# Patient Record
Sex: FEMALE | Race: WHITE | NOT HISPANIC OR LATINO
[De-identification: names, ages, dates, MRNs, and addresses within clinical notes are randomized per-mention and may not be internally consistent; named-entity substitution may affect disease eponyms.]

---

## 2017-01-30 ENCOUNTER — APPOINTMENT (OUTPATIENT)
Dept: INTERNAL MEDICINE | Facility: CLINIC | Age: 22
End: 2017-01-30

## 2017-01-30 VITALS
DIASTOLIC BLOOD PRESSURE: 80 MMHG | TEMPERATURE: 98.3 F | BODY MASS INDEX: 23.7 KG/M2 | WEIGHT: 160 LBS | HEIGHT: 69 IN | HEART RATE: 72 BPM | SYSTOLIC BLOOD PRESSURE: 120 MMHG

## 2017-01-30 VITALS — HEIGHT: 69 IN | BODY MASS INDEX: 23.25 KG/M2 | WEIGHT: 157 LBS

## 2017-01-30 DIAGNOSIS — Z87.09 PERSONAL HISTORY OF OTHER DISEASES OF THE RESPIRATORY SYSTEM: ICD-10-CM

## 2017-01-30 LAB — S PYO AG SPEC QL IA: NEGATIVE

## 2017-02-17 PROBLEM — Z87.09 HISTORY OF ACUTE SINUSITIS: Status: RESOLVED | Noted: 2017-01-30 | Resolved: 2017-02-17

## 2017-02-17 RX ORDER — CEFUROXIME AXETIL 250 MG/1
250 TABLET ORAL
Qty: 20 | Refills: 0 | Status: COMPLETED | COMMUNITY
Start: 2017-01-30 | End: 2017-02-17

## 2017-02-21 ENCOUNTER — RESULT CHARGE (OUTPATIENT)
Age: 22
End: 2017-02-21

## 2017-02-22 ENCOUNTER — APPOINTMENT (OUTPATIENT)
Dept: INTERNAL MEDICINE | Facility: CLINIC | Age: 22
End: 2017-02-22

## 2017-02-22 VITALS
HEART RATE: 60 BPM | HEIGHT: 69 IN | RESPIRATION RATE: 11 BRPM | WEIGHT: 158 LBS | DIASTOLIC BLOOD PRESSURE: 70 MMHG | BODY MASS INDEX: 23.4 KG/M2 | SYSTOLIC BLOOD PRESSURE: 120 MMHG

## 2017-02-22 DIAGNOSIS — N93.9 ABNORMAL UTERINE AND VAGINAL BLEEDING, UNSPECIFIED: ICD-10-CM

## 2017-02-22 DIAGNOSIS — Z87.09 PERSONAL HISTORY OF OTHER DISEASES OF THE RESPIRATORY SYSTEM: ICD-10-CM

## 2017-02-23 ENCOUNTER — RESULT REVIEW (OUTPATIENT)
Age: 22
End: 2017-02-23

## 2017-02-23 LAB
ALBUMIN SERPL ELPH-MCNC: 4.6 G/DL
ALP BLD-CCNC: 55 U/L
ALT SERPL-CCNC: 32 U/L
ANION GAP SERPL CALC-SCNC: 21 MMOL/L
APPEARANCE: CLEAR
AST SERPL-CCNC: 32 U/L
BACTERIA: NEGATIVE
BASOPHILS # BLD AUTO: 0.02 K/UL
BASOPHILS NFR BLD AUTO: 0.3 %
BILIRUB SERPL-MCNC: 0.4 MG/DL
BILIRUBIN URINE: NEGATIVE
BLOOD URINE: NEGATIVE
BUN SERPL-MCNC: 8 MG/DL
CALCIUM SERPL-MCNC: 9.6 MG/DL
CHLORIDE SERPL-SCNC: 102 MMOL/L
CHOLEST SERPL-MCNC: 146 MG/DL
CHOLEST/HDLC SERPL: 3.2 RATIO
CO2 SERPL-SCNC: 18 MMOL/L
COLOR: YELLOW
CREAT SERPL-MCNC: 0.71 MG/DL
EOSINOPHIL # BLD AUTO: 0.09 K/UL
EOSINOPHIL NFR BLD AUTO: 1.4 %
GLUCOSE QUALITATIVE U: NORMAL MG/DL
GLUCOSE SERPL-MCNC: 82 MG/DL
HCT VFR BLD CALC: 39.9 %
HDLC SERPL-MCNC: 46 MG/DL
HGB BLD-MCNC: 12.7 G/DL
HIV1+2 AB SPEC QL IA.RAPID: NONREACTIVE
HYALINE CASTS: 2 /LPF
IMM GRANULOCYTES NFR BLD AUTO: 0.2 %
KETONES URINE: NEGATIVE
LDLC SERPL CALC-MCNC: 77 MG/DL
LEUKOCYTE ESTERASE URINE: NEGATIVE
LYMPHOCYTES # BLD AUTO: 2.37 K/UL
LYMPHOCYTES NFR BLD AUTO: 37.2 %
MAN DIFF?: NORMAL
MCHC RBC-ENTMCNC: 28 PG
MCHC RBC-ENTMCNC: 31.8 GM/DL
MCV RBC AUTO: 88.1 FL
MICROSCOPIC-UA: NORMAL
MONOCYTES # BLD AUTO: 0.41 K/UL
MONOCYTES NFR BLD AUTO: 6.4 %
NEUTROPHILS # BLD AUTO: 3.47 K/UL
NEUTROPHILS NFR BLD AUTO: 54.5 %
NITRITE URINE: NEGATIVE
PH URINE: 6.5
PLATELET # BLD AUTO: 234 K/UL
POTASSIUM SERPL-SCNC: 3.9 MMOL/L
PROT SERPL-MCNC: 7.4 G/DL
PROTEIN URINE: NEGATIVE MG/DL
RBC # BLD: 4.53 M/UL
RBC # FLD: 13.3 %
RED BLOOD CELLS URINE: 1 /HPF
SODIUM SERPL-SCNC: 141 MMOL/L
SPECIFIC GRAVITY URINE: 1
SQUAMOUS EPITHELIAL CELLS: 4 /HPF
TRIGL SERPL-MCNC: 115 MG/DL
TSH SERPL-ACNC: 1.07 UIU/ML
UROBILINOGEN URINE: NORMAL MG/DL
WBC # FLD AUTO: 6.37 K/UL
WHITE BLOOD CELLS URINE: 1 /HPF

## 2017-07-06 ENCOUNTER — APPOINTMENT (OUTPATIENT)
Dept: INTERNAL MEDICINE | Facility: CLINIC | Age: 22
End: 2017-07-06

## 2017-07-28 ENCOUNTER — APPOINTMENT (OUTPATIENT)
Dept: INTERNAL MEDICINE | Facility: CLINIC | Age: 22
End: 2017-07-28
Payer: COMMERCIAL

## 2017-07-28 VITALS
TEMPERATURE: 99.2 F | WEIGHT: 158 LBS | BODY MASS INDEX: 23.4 KG/M2 | RESPIRATION RATE: 12 BRPM | HEIGHT: 69 IN | HEART RATE: 68 BPM | SYSTOLIC BLOOD PRESSURE: 120 MMHG | DIASTOLIC BLOOD PRESSURE: 70 MMHG

## 2017-07-28 DIAGNOSIS — H04.123 DRY EYE SYNDROME OF BILATERAL LACRIMAL GLANDS: ICD-10-CM

## 2017-07-28 PROCEDURE — 99214 OFFICE O/P EST MOD 30 MIN: CPT | Mod: 25

## 2017-07-28 PROCEDURE — 36415 COLL VENOUS BLD VENIPUNCTURE: CPT

## 2017-07-28 RX ORDER — OSELTAMIVIR PHOSPHATE 75 MG/1
75 CAPSULE ORAL
Qty: 10 | Refills: 0 | Status: DISCONTINUED | COMMUNITY
Start: 2017-04-05

## 2017-07-28 RX ORDER — PREDNISONE 20 MG/1
20 TABLET ORAL
Qty: 12 | Refills: 0 | Status: DISCONTINUED | COMMUNITY
Start: 2017-04-03

## 2017-07-28 RX ORDER — ALBUTEROL SULFATE 2.5 MG/3ML
(2.5 MG/3ML) SOLUTION RESPIRATORY (INHALATION)
Qty: 75 | Refills: 0 | Status: DISCONTINUED | COMMUNITY
Start: 2017-04-10

## 2017-07-28 RX ORDER — AZITHROMYCIN 250 MG/1
250 TABLET, FILM COATED ORAL
Qty: 6 | Refills: 0 | Status: DISCONTINUED | COMMUNITY
Start: 2017-04-10

## 2017-07-28 RX ORDER — FLUTICASONE PROPIONATE 50 UG/1
50 SPRAY, METERED NASAL
Qty: 16 | Refills: 0 | Status: DISCONTINUED | COMMUNITY
Start: 2017-04-05

## 2017-07-28 RX ORDER — IBUPROFEN 600 MG/1
600 TABLET, FILM COATED ORAL
Qty: 60 | Refills: 0 | Status: DISCONTINUED | COMMUNITY
Start: 2017-04-05

## 2017-07-30 LAB
ANA SER IF-ACNC: NEGATIVE
ENA SS-A AB SER IA-ACNC: <0.2 AL
ENA SS-B AB SER IA-ACNC: <0.2 AL
ERYTHROCYTE [SEDIMENTATION RATE] IN BLOOD BY WESTERGREN METHOD: 19 MM/HR
RHEUMATOID FACT SER QL: <7 IU/ML

## 2017-07-31 ENCOUNTER — RESULT REVIEW (OUTPATIENT)
Age: 22
End: 2017-07-31

## 2017-07-31 ENCOUNTER — TRANSCRIPTION ENCOUNTER (OUTPATIENT)
Age: 22
End: 2017-07-31

## 2018-08-24 ENCOUNTER — APPOINTMENT (OUTPATIENT)
Dept: INTERNAL MEDICINE | Facility: CLINIC | Age: 23
End: 2018-08-24

## 2019-07-19 ENCOUNTER — APPOINTMENT (OUTPATIENT)
Dept: INTERNAL MEDICINE | Facility: CLINIC | Age: 24
End: 2019-07-19
Payer: COMMERCIAL

## 2019-08-01 ENCOUNTER — APPOINTMENT (OUTPATIENT)
Dept: INTERNAL MEDICINE | Facility: CLINIC | Age: 24
End: 2019-08-01
Payer: COMMERCIAL

## 2019-08-01 VITALS
TEMPERATURE: 98.2 F | WEIGHT: 158 LBS | BODY MASS INDEX: 23.4 KG/M2 | HEART RATE: 70 BPM | HEIGHT: 69 IN | SYSTOLIC BLOOD PRESSURE: 120 MMHG | DIASTOLIC BLOOD PRESSURE: 80 MMHG

## 2019-08-01 PROCEDURE — 99213 OFFICE O/P EST LOW 20 MIN: CPT

## 2019-08-01 NOTE — HISTORY OF PRESENT ILLNESS
[FreeTextEntry8] : Patient presents complaining of not feeling well for about 2 weeks, worsened over the past week. Patient is complaining of sinus pressure/congestion with postnasal drip and reactive cough. Patient states sputum is discolored and thick. Patient has no fever, taking OTC medication without benefit

## 2019-08-01 NOTE — ASSESSMENT
[FreeTextEntry1] : Patient prescribed Ceftin 250 mg one b.i.d. #20/Nasonex.Patient advised to rest/increase fluids and use supportive therapy. Patient will call if symptoms persist or worsen and return to the office as scheduled for regular followup.\par \par \par Dr. Peter was present in office building while I examined patient\par

## 2019-08-01 NOTE — PHYSICAL EXAM
[No Acute Distress] : no acute distress [Normal Outer Ear/Nose] : the outer ears and nose were normal in appearance [Normal TMs] : both tympanic membranes were normal [No Respiratory Distress] : no respiratory distress  [Normal Rate] : normal rate  [Regular Rhythm] : with a regular rhythm [Normal Affect] : the affect was normal [Normal Mood] : the mood was normal [de-identified] : +Mild maxillary pressure,+ reactive cervical lymphadenopathy [de-identified] : nares clogged, +PND [de-identified] : +Increased cough noted,+ mild wheezing without rales or rhonchi

## 2020-01-20 ENCOUNTER — APPOINTMENT (OUTPATIENT)
Dept: INTERNAL MEDICINE | Facility: CLINIC | Age: 25
End: 2020-01-20
Payer: COMMERCIAL

## 2020-01-20 ENCOUNTER — NON-APPOINTMENT (OUTPATIENT)
Age: 25
End: 2020-01-20

## 2020-01-20 ENCOUNTER — MED ADMIN CHARGE (OUTPATIENT)
Age: 25
End: 2020-01-20

## 2020-01-20 VITALS
RESPIRATION RATE: 11 BRPM | DIASTOLIC BLOOD PRESSURE: 70 MMHG | HEIGHT: 69 IN | BODY MASS INDEX: 22.81 KG/M2 | WEIGHT: 154 LBS | SYSTOLIC BLOOD PRESSURE: 115 MMHG | HEART RATE: 75 BPM

## 2020-01-20 DIAGNOSIS — J06.9 ACUTE UPPER RESPIRATORY INFECTION, UNSPECIFIED: ICD-10-CM

## 2020-01-20 PROCEDURE — G0442 ANNUAL ALCOHOL SCREEN 15 MIN: CPT

## 2020-01-20 PROCEDURE — 36415 COLL VENOUS BLD VENIPUNCTURE: CPT

## 2020-01-20 PROCEDURE — G0008: CPT

## 2020-01-20 PROCEDURE — 99395 PREV VISIT EST AGE 18-39: CPT | Mod: 25

## 2020-01-20 PROCEDURE — 90674 CCIIV4 VAC NO PRSV 0.5 ML IM: CPT

## 2020-01-20 PROCEDURE — 93000 ELECTROCARDIOGRAM COMPLETE: CPT | Mod: 59

## 2020-01-20 RX ORDER — MOMETASONE 50 UG/1
50 SPRAY, METERED NASAL DAILY
Qty: 1 | Refills: 0 | Status: DISCONTINUED | COMMUNITY
Start: 2019-08-01 | End: 2020-01-20

## 2020-01-20 RX ORDER — CEFUROXIME AXETIL 250 MG/1
250 TABLET ORAL
Qty: 20 | Refills: 0 | Status: DISCONTINUED | COMMUNITY
Start: 2019-08-01 | End: 2020-01-20

## 2020-01-21 ENCOUNTER — RESULT REVIEW (OUTPATIENT)
Age: 25
End: 2020-01-21

## 2020-01-21 ENCOUNTER — TRANSCRIPTION ENCOUNTER (OUTPATIENT)
Age: 25
End: 2020-01-21

## 2020-01-21 ENCOUNTER — MED ADMIN CHARGE (OUTPATIENT)
Age: 25
End: 2020-01-21

## 2020-01-21 LAB
ALBUMIN SERPL ELPH-MCNC: 4.8 G/DL
ALP BLD-CCNC: 71 U/L
ALT SERPL-CCNC: 47 U/L
ANION GAP SERPL CALC-SCNC: 12 MMOL/L
AST SERPL-CCNC: 31 U/L
BASOPHILS # BLD AUTO: 0.03 K/UL
BASOPHILS NFR BLD AUTO: 0.6 %
BILIRUB SERPL-MCNC: 0.4 MG/DL
BUN SERPL-MCNC: 12 MG/DL
CALCIUM SERPL-MCNC: 9.7 MG/DL
CHLORIDE SERPL-SCNC: 104 MMOL/L
CHOLEST SERPL-MCNC: 156 MG/DL
CHOLEST/HDLC SERPL: 2.8 RATIO
CO2 SERPL-SCNC: 25 MMOL/L
CREAT SERPL-MCNC: 0.72 MG/DL
EOSINOPHIL # BLD AUTO: 0.06 K/UL
EOSINOPHIL NFR BLD AUTO: 1.2 %
GLUCOSE SERPL-MCNC: 91 MG/DL
HCT VFR BLD CALC: 37.8 %
HDLC SERPL-MCNC: 56 MG/DL
HGB BLD-MCNC: 11.7 G/DL
IMM GRANULOCYTES NFR BLD AUTO: 0.2 %
LDLC SERPL CALC-MCNC: 85 MG/DL
LYMPHOCYTES # BLD AUTO: 1.89 K/UL
LYMPHOCYTES NFR BLD AUTO: 36.9 %
MAN DIFF?: NORMAL
MCHC RBC-ENTMCNC: 27 PG
MCHC RBC-ENTMCNC: 31 GM/DL
MCV RBC AUTO: 87.3 FL
MONOCYTES # BLD AUTO: 0.37 K/UL
MONOCYTES NFR BLD AUTO: 7.2 %
NEUTROPHILS # BLD AUTO: 2.76 K/UL
NEUTROPHILS NFR BLD AUTO: 53.9 %
PLATELET # BLD AUTO: 276 K/UL
POTASSIUM SERPL-SCNC: 4.5 MMOL/L
PROT SERPL-MCNC: 7.2 G/DL
RBC # BLD: 4.33 M/UL
RBC # FLD: 13.1 %
SODIUM SERPL-SCNC: 141 MMOL/L
TRIGL SERPL-MCNC: 72 MG/DL
WBC # FLD AUTO: 5.12 K/UL

## 2020-01-21 NOTE — PHYSICAL EXAM
[General Appearance - In No Acute Distress] : in no acute distress [PERRL With Normal Accommodation] : pupils were equal in size, round, and reactive to light [Extraocular Movements] : extraocular movements were intact [Oropharynx] : the oropharynx was normal [Both Tympanic Membranes Were Examined] : both tympanic membranes were normal [Neck Cervical Mass (___cm)] : no neck mass was observed [] : the neck was supple [Respiration, Rhythm And Depth] : normal respiratory rhythm and effort [Auscultation Breath Sounds / Voice Sounds] : lungs were clear to auscultation bilaterally [Heart Rate And Rhythm] : heart rate was normal and rhythm regular [Arterial Pulses Carotid] : carotid pulses were normal with no bruits [Breast Appearance] : normal in appearance [Edema] : there was no peripheral edema [Full Pulse] : the pedal pulses are present [Bowel Sounds] : normal bowel sounds [Breast Palpation Mass] : no palpable masses [Breast Abnormal Lactation (Galactorrhea)] : no nipple discharge [Abdomen Mass (___ Cm)] : no abdominal mass palpated [Abdomen Tenderness] : non-tender [Abdomen Soft] : soft [No Spinal Tenderness] : no spinal tenderness [No CVA Tenderness] : no ~M costovertebral angle tenderness [Motor Tone] : muscle strength and tone were normal [Involuntary Movements] : no involuntary movements were seen [Skin Color & Pigmentation] : normal skin color and pigmentation [Abnormal Walk] : normal gait [No Focal Deficits] : no focal deficits [Oriented To Time, Place, And Person] : oriented to person, place, and time [Cranial Nerves] : cranial nerves 2-12 were intact [Affect] : the affect was normal [Mood] : the mood was normal [FreeTextEntry1] : VIA GYN [Over the Past 2 Weeks, Have You Felt Down, Depressed, or Hopeless?] : 1.) Over the past 2 weeks, have you felt down, depressed, or hopeless? No [Over the Past 2 Weeks, Have You Felt Little Interest or Pleasure Doing Things?] : 2.) Over the past 2 weeks, have you felt little interest or pleasure doing things? No

## 2020-01-21 NOTE — HEALTH RISK ASSESSMENT
[Very Good] : ~his/her~  mood as very good [Yes] : Yes [2 - 4 times a month (2 pts)] : 2-4 times a month (2 points) [3 or 4 (1 pt)] : 3 or 4  (1 point) [Never (0 pts)] : Never (0 points) [No falls in past year] : Patient reported no falls in the past year [No] : In the past 12 months have you used drugs other than those required for medical reasons? No [0] : 2) Feeling down, depressed, or hopeless: Not at all (0) [Patient reported PAP Smear was normal] : Patient reported PAP Smear was normal [With Family] : lives with family [None] : None [Employed] : employed [Single] : single [College] : College [Fully functional (using the telephone, shopping, preparing meals, housekeeping, doing laundry, using] : Fully functional and needs no help or supervision to perform IADLs (using the telephone, shopping, preparing meals, housekeeping, doing laundry, using transportation, managing medications and managing finances) [Fully functional (bathing, dressing, toileting, transferring, walking, feeding)] : Fully functional (bathing, dressing, toileting, transferring, walking, feeding) [Carbon Monoxide Detector] : carbon monoxide detector [Smoke Detector] : smoke detector [Seat Belt] :  uses seat belt [Sunscreen] : uses sunscreen [] : No [de-identified] : exercises occ [Audit-CScore] : 3 [Change in mental status noted] : No change in mental status noted [de-identified] : good [QCS7Gyxyd] : 0 [Reports changes in vision] : Reports no changes in vision [Sexually Active] : not sexually active [Reports changes in hearing] : Reports no changes in hearing [PapSmearDate] : 02/19 [FreeTextEntry2] :  in [place] [Reports changes in dental health] : Reports no changes in dental health [AdvancecareDate] : 01/20

## 2020-01-21 NOTE — HISTORY OF PRESENT ILLNESS
[FreeTextEntry1] : 24-year-old female presents for routine physical exam.. \par \par Patient has no active medical problems, on no daily medications. \par \par The patient is asymptomatic denying chest pain, palpitations, shortness of breath or edema.\par \par July 2017 the patient complained of joint aches and dry eyes with screening rheumatology blood work normal. Therefore patient was referred for rheumatology evaluation which she did not do.\par Her dry eye issue has resolved with changing her contact lenses.\par no significant joint symptoms.\par \par The patient is single and works as a  in [place].\par \par

## 2020-01-21 NOTE — ASSESSMENT
[FreeTextEntry1] : 24-year-old female currently medically stable with no chronic medical issues.\par \par Encourage good diet with regular exercise\par \par Influenza vaccine given left deltoid\par \par GYN followup yearly\par \par Venipuncture done today in the office\par \par Followup yearly and as needed\par \par \par

## 2020-03-14 ENCOUNTER — TRANSCRIPTION ENCOUNTER (OUTPATIENT)
Age: 25
End: 2020-03-14

## 2020-03-17 LAB
ALBUMIN SERPL ELPH-MCNC: 4.8 G/DL
ALP BLD-CCNC: 83 U/L
ALT SERPL-CCNC: 70 U/L
AST SERPL-CCNC: 64 U/L
BILIRUB DIRECT SERPL-MCNC: 0.1 MG/DL
BILIRUB INDIRECT SERPL-MCNC: 0.2 MG/DL
BILIRUB SERPL-MCNC: 0.2 MG/DL
PROT SERPL-MCNC: 7.2 G/DL

## 2020-03-20 ENCOUNTER — APPOINTMENT (OUTPATIENT)
Dept: INTERNAL MEDICINE | Facility: CLINIC | Age: 25
End: 2020-03-20
Payer: COMMERCIAL

## 2020-03-20 VITALS
BODY MASS INDEX: 23.85 KG/M2 | HEART RATE: 65 BPM | SYSTOLIC BLOOD PRESSURE: 110 MMHG | RESPIRATION RATE: 10 BRPM | OXYGEN SATURATION: 98 % | TEMPERATURE: 98.8 F | WEIGHT: 161 LBS | DIASTOLIC BLOOD PRESSURE: 70 MMHG | HEIGHT: 69 IN

## 2020-03-20 PROCEDURE — 36415 COLL VENOUS BLD VENIPUNCTURE: CPT

## 2020-03-20 PROCEDURE — 99213 OFFICE O/P EST LOW 20 MIN: CPT | Mod: 25

## 2020-03-20 NOTE — PHYSICAL EXAM
[No Acute Distress] : no acute distress [Well Nourished] : well nourished [Well-Appearing] : well-appearing [Normal Sclera/Conjunctiva] : normal sclera/conjunctiva [No Respiratory Distress] : no respiratory distress  [No Accessory Muscle Use] : no accessory muscle use [Clear to Auscultation] : lungs were clear to auscultation bilaterally [Normal Rate] : normal rate  [Regular Rhythm] : with a regular rhythm [No Edema] : there was no peripheral edema [Soft] : abdomen soft [Non-distended] : non-distended [No Masses] : no abdominal mass palpated [No HSM] : no HSM [Normal Bowel Sounds] : normal bowel sounds [No Hernias] : no hernias [No Spinal Tenderness] : no spinal tenderness [No Rash] : no rash [Normal Gait] : normal gait [Normal Affect] : the affect was normal [de-identified] : Mild right greater than left upper abdominal tenderness to palpation

## 2020-03-20 NOTE — ASSESSMENT
[FreeTextEntry1] : Patient with mildly elevated SGOT and SGPT of questionable etiology.\par Mid back and upper abdominal symptoms of questionable etiology therefore patient sent for abdominal ultrasound.\par Blood work done to check CMP, CBC, amylase, lipase and acute hepatitis profile.\par Patient is to report any other symptoms.

## 2020-03-20 NOTE — HISTORY OF PRESENT ILLNESS
[FreeTextEntry8] : Patient presents with recent blood work showing mildly elevated SGOT and SGPT but also is having abdominal symptoms.\par The patient states for about one month on and off she's had mild mid back aching pain on both sides which radiates around to the front usually more on the right than the left.\par No mid abdominal pain.\par No nausea, vomiting, change in bowels or urine or change in appetite.\par Back pain increases in certain positions.\par Patient has taken an Advil occasionally with ? benefit.

## 2020-03-21 ENCOUNTER — OUTPATIENT (OUTPATIENT)
Dept: OUTPATIENT SERVICES | Facility: HOSPITAL | Age: 25
LOS: 1 days | End: 2020-03-21
Payer: COMMERCIAL

## 2020-03-21 ENCOUNTER — APPOINTMENT (OUTPATIENT)
Dept: ULTRASOUND IMAGING | Facility: CLINIC | Age: 25
End: 2020-03-21
Payer: COMMERCIAL

## 2020-03-21 DIAGNOSIS — R74.8 ABNORMAL LEVELS OF OTHER SERUM ENZYMES: ICD-10-CM

## 2020-03-21 DIAGNOSIS — R10.10 UPPER ABDOMINAL PAIN, UNSPECIFIED: ICD-10-CM

## 2020-03-21 PROCEDURE — 76700 US EXAM ABDOM COMPLETE: CPT

## 2020-03-21 PROCEDURE — 76700 US EXAM ABDOM COMPLETE: CPT | Mod: 26

## 2020-03-22 DIAGNOSIS — Z86.2 PERSONAL HISTORY OF DISEASES OF THE BLOOD AND BLOOD-FORMING ORGANS AND CERTAIN DISORDERS INVOLVING THE IMMUNE MECHANISM: ICD-10-CM

## 2020-03-23 LAB
ALBUMIN SERPL ELPH-MCNC: 4.6 G/DL
ALP BLD-CCNC: 83 U/L
ALT SERPL-CCNC: 98 U/L
AMYLASE/CREAT SERPL: 76 U/L
ANION GAP SERPL CALC-SCNC: 13 MMOL/L
AST SERPL-CCNC: 77 U/L
BASOPHILS # BLD AUTO: 0.03 K/UL
BASOPHILS NFR BLD AUTO: 0.6 %
BILIRUB SERPL-MCNC: 0.4 MG/DL
BUN SERPL-MCNC: 9 MG/DL
CALCIUM SERPL-MCNC: 9.3 MG/DL
CHLORIDE SERPL-SCNC: 105 MMOL/L
CO2 SERPL-SCNC: 24 MMOL/L
CREAT SERPL-MCNC: 0.62 MG/DL
EOSINOPHIL # BLD AUTO: 0.05 K/UL
EOSINOPHIL NFR BLD AUTO: 1 %
FERRITIN SERPL-MCNC: 26 NG/ML
FOLATE SERPL-MCNC: 13.3 NG/ML
GLUCOSE SERPL-MCNC: 122 MG/DL
HAPTOGLOB SERPL-MCNC: 84 MG/DL
HAV IGM SER QL: NONREACTIVE
HBV CORE IGM SER QL: NONREACTIVE
HBV SURFACE AG SER QL: NONREACTIVE
HCT VFR BLD CALC: 36.1 %
HCV AB SER QL: NONREACTIVE
HCV S/CO RATIO: 0.21 S/CO
HGB BLD-MCNC: 11.3 G/DL
IMM GRANULOCYTES NFR BLD AUTO: 0.2 %
IRON SATN MFR SERPL: 20 %
IRON SERPL-MCNC: 68 UG/DL
LPL SERPL-CCNC: 55 U/L
LYMPHOCYTES # BLD AUTO: 1.49 K/UL
LYMPHOCYTES NFR BLD AUTO: 31 %
MAN DIFF?: NORMAL
MCHC RBC-ENTMCNC: 27.1 PG
MCHC RBC-ENTMCNC: 31.3 GM/DL
MCV RBC AUTO: 86.6 FL
MONOCYTES # BLD AUTO: 0.45 K/UL
MONOCYTES NFR BLD AUTO: 9.4 %
NEUTROPHILS # BLD AUTO: 2.77 K/UL
NEUTROPHILS NFR BLD AUTO: 57.8 %
PLATELET # BLD AUTO: 265 K/UL
POTASSIUM SERPL-SCNC: 4 MMOL/L
PROT SERPL-MCNC: 6.7 G/DL
RBC # BLD: 4.17 M/UL
RBC # FLD: 13.2 %
SODIUM SERPL-SCNC: 142 MMOL/L
TIBC SERPL-MCNC: 333 UG/DL
UIBC SERPL-MCNC: 265 UG/DL
VIT B12 SERPL-MCNC: 508 PG/ML
WBC # FLD AUTO: 4.8 K/UL

## 2020-04-16 LAB
ALBUMIN SERPL ELPH-MCNC: 4.8 G/DL
ALP BLD-CCNC: 91 U/L
ALT SERPL-CCNC: 97 U/L
AST SERPL-CCNC: 60 U/L
BASOPHILS # BLD AUTO: 0.04 K/UL
BASOPHILS NFR BLD AUTO: 0.5 %
BILIRUB DIRECT SERPL-MCNC: 0.1 MG/DL
BILIRUB INDIRECT SERPL-MCNC: 0.2 MG/DL
BILIRUB SERPL-MCNC: 0.3 MG/DL
EOSINOPHIL # BLD AUTO: 0.08 K/UL
EOSINOPHIL NFR BLD AUTO: 1 %
HCT VFR BLD CALC: 39.3 %
HGB BLD-MCNC: 12.4 G/DL
IMM GRANULOCYTES NFR BLD AUTO: 0.4 %
LYMPHOCYTES # BLD AUTO: 2.11 K/UL
LYMPHOCYTES NFR BLD AUTO: 27.5 %
MAN DIFF?: NORMAL
MCHC RBC-ENTMCNC: 27.3 PG
MCHC RBC-ENTMCNC: 31.6 GM/DL
MCV RBC AUTO: 86.4 FL
MONOCYTES # BLD AUTO: 0.67 K/UL
MONOCYTES NFR BLD AUTO: 8.7 %
NEUTROPHILS # BLD AUTO: 4.74 K/UL
NEUTROPHILS NFR BLD AUTO: 61.9 %
PLATELET # BLD AUTO: 255 K/UL
PROT SERPL-MCNC: 7.3 G/DL
RBC # BLD: 4.55 M/UL
RBC # FLD: 12.9 %
WBC # FLD AUTO: 7.67 K/UL

## 2020-04-17 ENCOUNTER — APPOINTMENT (OUTPATIENT)
Dept: GASTROENTEROLOGY | Facility: CLINIC | Age: 25
End: 2020-04-17
Payer: COMMERCIAL

## 2020-04-17 VITALS
HEIGHT: 69 IN | OXYGEN SATURATION: 99 % | WEIGHT: 160 LBS | HEART RATE: 85 BPM | DIASTOLIC BLOOD PRESSURE: 78 MMHG | BODY MASS INDEX: 23.7 KG/M2 | SYSTOLIC BLOOD PRESSURE: 116 MMHG

## 2020-04-17 DIAGNOSIS — R10.10 UPPER ABDOMINAL PAIN, UNSPECIFIED: ICD-10-CM

## 2020-04-17 PROCEDURE — 99203 OFFICE O/P NEW LOW 30 MIN: CPT

## 2020-04-17 NOTE — HISTORY OF PRESENT ILLNESS
[_________] : Performed [unfilled] [de-identified] : This is a 24-year-old woman, who presents with elevated transaminases. She had associated right flank pain for a couple of weeks, which has resolved. She denies any prior history of elevated liver tests. She denies any family history of liver disease. She denies heavy alcohol use and has not had any alcohol for a few months. She denies any supplements or OTC medications. She sees Dr. Peter and she had several blood tests. her transaminases have trended up from an ALT of 47-97 over the last 3 months and her AST is trended from 31 to 60. Also her viral hepatitis panel was negative. Ultrasound of the abdomen did not show any gallstones, biliary dilatation, fatty infiltration or gallbladder pathology. her mother is prediabetic. 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 She denies any recent viral URI symptoms. She has a history of vaginal spotting associated with exercise but she denies any GI issues or rectal bleeding.  [de-identified] : \par  US Abdomen Complete             Final\par \par No Documents Attached\par \par \par \par \par   EXAM:  US ABDOMEN COMPLETE\par \par \par PROCEDURE DATE:  03/21/2020\par \par \par \par INTERPRETATION:  CLINICAL INFORMATION: Elevated liver enzymes and abdominal discomfort.\par \par COMPARISON: None available.\par \par TECHNIQUE: Sonography of the abdomen.\par \par FINDINGS:\par \par Liver: Within normal limits.\par \par Bile ducts: Normal caliber. Common bile duct measures 3 mm.\par \par Gallbladder: Within normal limits.\par \par Pancreas: Visualized portions are within normal limits.\par \par Spleen: 12.6 cm. Within normal limits.\par \par Right kidney: 10.8 cm. No hydronephrosis.\par \par Left kidney: 10.5 cm.  No hydronephrosis.\par \par Ascites: None.\par \par Aorta and IVC: Visualized portions are within normal limits.\par \par IMPRESSION:\par \par Normal abdominal ultrasound.\par \par \par \par \par \par \par \par \par \par LUX TORRES M.D., ATTENDING RADIOLOGIST\par This document has been electronically signed. Mar 21 2020  1:28PM\par \par  \par \par  Ordered by: HAVEN REBOLLEDO       Collected/Examined: 21Mar2020 12:56PM       \par Verified by: HAVEN REOBLLEDO 23Mar2020 05:59PM       \par  Result Communication: No patient communication needed at this time;\par Stage: Final       \par  Performed at: [facility]       Resulted: 21Mar2020 01:31PM       Last Updated: 23Mar2020 05:58PM       Accession: [id number]

## 2020-04-17 NOTE — CONSULT LETTER
[Dear  ___] : Dear  [unfilled], [Consult Letter:] : I had the pleasure of evaluating your patient, [unfilled]. [Please see my note below.] : Please see my note below. [Consult Closing:] : Thank you very much for allowing me to participate in the care of this patient.  If you have any questions, please do not hesitate to contact me. [Sincerely,] : Sincerely, [FreeTextEntry3] : Vishal Thompson M.D.

## 2020-04-17 NOTE — PHYSICAL EXAM
[General Appearance - Alert] : alert [General Appearance - In No Acute Distress] : in no acute distress [General Appearance - Well Nourished] : well nourished [General Appearance - Well Developed] : well developed [General Appearance - Well-Appearing] : healthy appearing [Sclera] : the sclera and conjunctiva were normal [PERRL With Normal Accommodation] : pupils were equal in size, round, and reactive to light [Extraocular Movements] : extraocular movements were intact [Outer Ear] : the ears and nose were normal in appearance [Hearing Threshold Finger Rub Not [place]] : hearing was normal [Both Tympanic Membranes Were Examined] : both tympanic membranes were normal [Neck Appearance] : the appearance of the neck was normal [] : no respiratory distress [Skin Color & Pigmentation] : normal skin color and pigmentation [Oriented To Time, Place, And Person] : oriented to person, place, and time [Impaired Insight] : insight and judgment were intact [Affect] : the affect was normal

## 2020-04-17 NOTE — ASSESSMENT
[FreeTextEntry1] : This is a 24-year-old woman, who presents with mildly elevated transaminases that trended upwards. lab work to evaluate for thyroid issues, celiac disease. Rule out autoimmune, hepatitis, and other chronic liver pathology. She will get the blood work in 2 weeks and follow up in 1-2 weeks via TELEMED/telehealth visit.

## 2020-05-04 LAB
A1AT SERPL-MCNC: 128 MG/DL
ALBUMIN SERPL ELPH-MCNC: 4.7 G/DL
ALP BLD-CCNC: 71 U/L
ALT SERPL-CCNC: 51 U/L
AST SERPL-CCNC: 37 U/L
BILIRUB DIRECT SERPL-MCNC: 0.1 MG/DL
BILIRUB INDIRECT SERPL-MCNC: 0.2 MG/DL
BILIRUB SERPL-MCNC: 0.3 MG/DL
CERULOPLASMIN SERPL-MCNC: 22 MG/DL
DEPRECATED KAPPA LC FREE/LAMBDA SER: 1.27 RATIO
GGT SERPL-CCNC: 62 U/L
IGA SER QL IEP: 196 MG/DL
IGG SER QL IEP: 967 MG/DL
IGM SER QL IEP: 337 MG/DL
KAPPA LC CSF-MCNC: 1.19 MG/DL
KAPPA LC SERPL-MCNC: 1.51 MG/DL
PROT SERPL-MCNC: 7 G/DL
T4 FREE SERPL-MCNC: 1.2 NG/DL
TSH SERPL-ACNC: 1.17 UIU/ML

## 2020-05-05 LAB
TTG IGA SER IA-ACNC: <1.2 U/ML
TTG IGA SER-ACNC: NEGATIVE
TTG IGG SER IA-ACNC: 3.9 U/ML
TTG IGG SER IA-ACNC: NEGATIVE

## 2020-05-06 LAB
MITOCHONDRIA AB SER IF-ACNC: NORMAL
SMOOTH MUSCLE AB SER QL IF: NORMAL

## 2020-05-15 ENCOUNTER — APPOINTMENT (OUTPATIENT)
Dept: GASTROENTEROLOGY | Facility: CLINIC | Age: 25
End: 2020-05-15
Payer: COMMERCIAL

## 2020-05-15 PROCEDURE — 99203 OFFICE O/P NEW LOW 30 MIN: CPT | Mod: 95

## 2020-05-15 NOTE — ASSESSMENT
[FreeTextEntry1] : This is a 24-year-old woman, who presents with elevated transaminases of unclear etiology. I suspect she may have had a viral infection that caused this but it is unclear. Her transaminases are improving I will recheck them in 2-3 weeks.

## 2020-05-15 NOTE — HISTORY OF PRESENT ILLNESS
[_________] : Performed [unfilled] [Home] : at home, [unfilled] , at the time of the visit. [Medical Office: ([facility])___] : at the medical office located in  [Patient] : the patient [de-identified] : Telehealth consent obtained. Time spent 15 min\par \par This is a 24-year-old woman, who presents with elevated transaminases. She had a lab work, and her blood tests are normal. She still has a slightly elevated ALT and GGT, however, all the other testing was normal. She feels fine. She denies any COVID symptoms.  [de-identified] : \par  US Abdomen Complete             Final\par \par No Documents Attached\par \par \par \par \par   EXAM:  US ABDOMEN COMPLETE\par \par \par PROCEDURE DATE:  03/21/2020\par \par \par \par INTERPRETATION:  CLINICAL INFORMATION: Elevated liver enzymes and abdominal discomfort.\par \par COMPARISON: None available.\par \par TECHNIQUE: Sonography of the abdomen.\par \par FINDINGS:\par \par Liver: Within normal limits.\par \par Bile ducts: Normal caliber. Common bile duct measures 3 mm.\par \par Gallbladder: Within normal limits.\par \par Pancreas: Visualized portions are within normal limits.\par \par Spleen: 12.6 cm. Within normal limits.\par \par Right kidney: 10.8 cm. No hydronephrosis.\par \par Left kidney: 10.5 cm.  No hydronephrosis.\par \par Ascites: None.\par \par Aorta and IVC: Visualized portions are within normal limits.\par \par IMPRESSION:\par \par Normal abdominal ultrasound.\par \par \par \par \par \par \par \par \par \par LUX TORRES M.D., ATTENDING RADIOLOGIST\par This document has been electronically signed. Mar 21 2020  1:28PM\par \par  \par \par  Ordered by: HAVEN REBOLLEDO       Collected/Examined: 21Mar2020 12:56PM       \par Verified by: HAVEN REBOLLEDO 23Mar2020 05:59PM       \par  Result Communication: No patient communication needed at this time;\par Stage: Final       \par  Performed at: [facility]       Resulted: 21Mar2020 01:31PM       Last Updated: 23Mar2020 05:58PM       Accession: [id number]

## 2020-05-15 NOTE — PHYSICAL EXAM
[General Appearance - Alert] : alert [General Appearance - In No Acute Distress] : in no acute distress [Sclera] : the sclera and conjunctiva were normal [Outer Ear] : the ears and nose were normal in appearance [Extraocular Movements] : extraocular movements were intact [Neck Appearance] : the appearance of the neck was normal [Bowel Sounds] : normal bowel sounds [Abdomen Soft] : soft [Skin Turgor] : normal skin turgor [Skin Color & Pigmentation] : normal skin color and pigmentation [Oriented To Time, Place, And Person] : oriented to person, place, and time [Impaired Insight] : insight and judgment were intact [] : no rash [Affect] : the affect was normal

## 2020-06-05 LAB
ALBUMIN SERPL ELPH-MCNC: 4.7 G/DL
ALP BLD-CCNC: 60 U/L
ALT SERPL-CCNC: 25 U/L
AST SERPL-CCNC: 30 U/L
BILIRUB DIRECT SERPL-MCNC: 0.1 MG/DL
BILIRUB INDIRECT SERPL-MCNC: 0.2 MG/DL
BILIRUB SERPL-MCNC: 0.3 MG/DL
PROT SERPL-MCNC: 7.3 G/DL

## 2020-10-16 ENCOUNTER — APPOINTMENT (OUTPATIENT)
Dept: INTERNAL MEDICINE | Facility: CLINIC | Age: 25
End: 2020-10-16
Payer: COMMERCIAL

## 2020-10-16 VITALS
RESPIRATION RATE: 12 BRPM | SYSTOLIC BLOOD PRESSURE: 120 MMHG | BODY MASS INDEX: 23.4 KG/M2 | HEIGHT: 69 IN | HEART RATE: 67 BPM | DIASTOLIC BLOOD PRESSURE: 70 MMHG | TEMPERATURE: 96.8 F | WEIGHT: 158 LBS

## 2020-10-16 PROCEDURE — 99213 OFFICE O/P EST LOW 20 MIN: CPT

## 2020-10-16 NOTE — ASSESSMENT
[FreeTextEntry1] : Unusual bilateral first toe numbness and paresthesias with symptoms are symmetrical without any other signs or symptoms.\par Cause more likely related to something with the each foot therefore refer patient to podiatry for evaluation.

## 2020-10-16 NOTE — PHYSICAL EXAM
[No Acute Distress] : no acute distress [No Respiratory Distress] : no respiratory distress  [Well-Appearing] : well-appearing [No Accessory Muscle Use] : no accessory muscle use [Regular Rhythm] : with a regular rhythm [Normal Rate] : normal rate  [Clear to Auscultation] : lungs were clear to auscultation bilaterally [Coordination Grossly Intact] : coordination grossly intact [No Focal Deficits] : no focal deficits [No Edema] : there was no peripheral edema [de-identified] : Bilateral feet examined showing no swelling, erythema or rash.\par Feet are both normal visibly.\par When palpating both first toes patient feels abnormal sensation of numbness and causes pins and needles.\par Normal range of motion. [Normal Gait] : normal gait [Normal Affect] : the affect was normal

## 2020-10-16 NOTE — HISTORY OF PRESENT ILLNESS
[FreeTextEntry8] : The patient presents for an acute visit secondary to one month of numbness of both first toes.\par The patient states it started a month ago without incident such as fall or trauma. It has persisted to the same degree without worsening or improvement.\par She also notices tingling when she touches the toes.\par The numbness and tingling only involve the first toe on each foot and not any other toes or any part of her foot.\par No visible changes such as swelling or redness.\par No other neurological symptoms.\par She has not tried any treatment.\par The patient does wear heels to go to work.

## 2020-12-10 ENCOUNTER — TRANSCRIPTION ENCOUNTER (OUTPATIENT)
Age: 25
End: 2020-12-10

## 2020-12-11 ENCOUNTER — NON-APPOINTMENT (OUTPATIENT)
Age: 25
End: 2020-12-11

## 2021-01-28 ENCOUNTER — NON-APPOINTMENT (OUTPATIENT)
Age: 26
End: 2021-01-28

## 2021-01-28 ENCOUNTER — APPOINTMENT (OUTPATIENT)
Dept: INTERNAL MEDICINE | Facility: CLINIC | Age: 26
End: 2021-01-28
Payer: COMMERCIAL

## 2021-01-28 VITALS
WEIGHT: 152 LBS | SYSTOLIC BLOOD PRESSURE: 115 MMHG | RESPIRATION RATE: 11 BRPM | TEMPERATURE: 97.2 F | HEART RATE: 66 BPM | BODY MASS INDEX: 22.51 KG/M2 | DIASTOLIC BLOOD PRESSURE: 60 MMHG | HEIGHT: 69 IN

## 2021-01-28 DIAGNOSIS — Z92.29 PERSONAL HISTORY OF OTHER DRUG THERAPY: ICD-10-CM

## 2021-01-28 DIAGNOSIS — Z67.40 TYPE O BLOOD, RH POSITIVE: ICD-10-CM

## 2021-01-28 DIAGNOSIS — Z11.59 ENCOUNTER FOR SCREENING FOR OTHER VIRAL DISEASES: ICD-10-CM

## 2021-01-28 PROCEDURE — 99395 PREV VISIT EST AGE 18-39: CPT | Mod: 25

## 2021-01-28 PROCEDURE — 93000 ELECTROCARDIOGRAM COMPLETE: CPT | Mod: 59

## 2021-01-28 PROCEDURE — 99072 ADDL SUPL MATRL&STAF TM PHE: CPT

## 2021-01-28 PROCEDURE — 36415 COLL VENOUS BLD VENIPUNCTURE: CPT

## 2021-01-28 PROCEDURE — G0442 ANNUAL ALCOHOL SCREEN 15 MIN: CPT | Mod: NC

## 2021-01-28 PROCEDURE — 90686 IIV4 VACC NO PRSV 0.5 ML IM: CPT

## 2021-01-28 PROCEDURE — G0008: CPT

## 2021-01-28 PROCEDURE — G0444 DEPRESSION SCREEN ANNUAL: CPT | Mod: NC,59

## 2021-01-31 PROBLEM — Z67.40 BLOOD TYPE O+: Status: ACTIVE | Noted: 2021-01-31

## 2021-01-31 LAB
ABO + RH PNL BLD: NORMAL
ALBUMIN SERPL ELPH-MCNC: 4.7 G/DL
ALP BLD-CCNC: 82 U/L
ALT SERPL-CCNC: 47 U/L
ANION GAP SERPL CALC-SCNC: 14 MMOL/L
AST SERPL-CCNC: 48 U/L
BASOPHILS # BLD AUTO: 0.02 K/UL
BASOPHILS NFR BLD AUTO: 0.3 %
BILIRUB SERPL-MCNC: 0.4 MG/DL
BUN SERPL-MCNC: 14 MG/DL
CALCIUM SERPL-MCNC: 9.5 MG/DL
CHLORIDE SERPL-SCNC: 101 MMOL/L
CHOLEST SERPL-MCNC: 148 MG/DL
CO2 SERPL-SCNC: 22 MMOL/L
CREAT SERPL-MCNC: 0.74 MG/DL
EOSINOPHIL # BLD AUTO: 0.06 K/UL
EOSINOPHIL NFR BLD AUTO: 0.8 %
FERRITIN SERPL-MCNC: 31 NG/ML
GLUCOSE SERPL-MCNC: 76 MG/DL
HCT VFR BLD CALC: 37.1 %
HDLC SERPL-MCNC: 50 MG/DL
HGB BLD-MCNC: 11.3 G/DL
IMM GRANULOCYTES NFR BLD AUTO: 0.3 %
IRON SATN MFR SERPL: 21 %
IRON SERPL-MCNC: 71 UG/DL
LDLC SERPL CALC-MCNC: 83 MG/DL
LYMPHOCYTES # BLD AUTO: 2.18 K/UL
LYMPHOCYTES NFR BLD AUTO: 30.7 %
MAN DIFF?: NORMAL
MCHC RBC-ENTMCNC: 26.2 PG
MCHC RBC-ENTMCNC: 30.5 GM/DL
MCV RBC AUTO: 85.9 FL
MONOCYTES # BLD AUTO: 0.54 K/UL
MONOCYTES NFR BLD AUTO: 7.6 %
NEUTROPHILS # BLD AUTO: 4.28 K/UL
NEUTROPHILS NFR BLD AUTO: 60.3 %
NONHDLC SERPL-MCNC: 98 MG/DL
PLATELET # BLD AUTO: 277 K/UL
POTASSIUM SERPL-SCNC: 3.9 MMOL/L
PROT SERPL-MCNC: 7.4 G/DL
RBC # BLD: 4.32 M/UL
RBC # FLD: 13.6 %
SARS-COV-2 IGG SERPL IA-ACNC: 0.07 INDEX
SARS-COV-2 IGG SERPL QL IA: NEGATIVE
SODIUM SERPL-SCNC: 137 MMOL/L
TIBC SERPL-MCNC: 341 UG/DL
TRIGL SERPL-MCNC: 73 MG/DL
UIBC SERPL-MCNC: 271 UG/DL
WBC # FLD AUTO: 7.1 K/UL

## 2021-01-31 NOTE — PHYSICAL EXAM
[General Appearance - In No Acute Distress] : in no acute distress [PERRL With Normal Accommodation] : pupils were equal in size, round, and reactive to light [Extraocular Movements] : extraocular movements were intact [Both Tympanic Membranes Were Examined] : both tympanic membranes were normal [Oropharynx] : the oropharynx was normal [Neck Cervical Mass (___cm)] : no neck mass was observed [] : no respiratory distress [Respiration, Rhythm And Depth] : normal respiratory rhythm and effort [Auscultation Breath Sounds / Voice Sounds] : lungs were clear to auscultation bilaterally [Heart Rate And Rhythm] : heart rate was normal and rhythm regular [Arterial Pulses Carotid] : carotid pulses were normal with no bruits [Full Pulse] : the pedal pulses are present [Edema] : there was no peripheral edema [Breast Appearance] : normal in appearance [Breast Abnormal Lactation (Galactorrhea)] : no nipple discharge [Breast Palpation Mass] : no palpable masses [Bowel Sounds] : normal bowel sounds [Abdomen Soft] : soft [Abdomen Tenderness] : non-tender [Abdomen Mass (___ Cm)] : no abdominal mass palpated [No CVA Tenderness] : no ~M costovertebral angle tenderness [No Spinal Tenderness] : no spinal tenderness [Abnormal Walk] : normal gait [Involuntary Movements] : no involuntary movements were seen [Motor Tone] : muscle strength and tone were normal [Skin Color & Pigmentation] : normal skin color and pigmentation [No Focal Deficits] : no focal deficits [Cranial Nerves] : cranial nerves 2-12 were intact [Oriented To Time, Place, And Person] : oriented to person, place, and time [Affect] : the affect was normal [Mood] : the mood was normal [FreeTextEntry1] : VIA GYN [Over the Past 2 Weeks, Have You Felt Down, Depressed, or Hopeless?] : 1.) Over the past 2 weeks, have you felt down, depressed, or hopeless? No [Over the Past 2 Weeks, Have You Felt Little Interest or Pleasure Doing Things?] : 2.) Over the past 2 weeks, have you felt little interest or pleasure doing things? No

## 2021-01-31 NOTE — HISTORY OF PRESENT ILLNESS
[FreeTextEntry1] : 25-year-old female presents for routine physical exam.. \par \par Patient has no active medical problems, on no daily medications. \par \par The patient is asymptomatic denying chest pain, palpitations, shortness of breath or edema.\par \par Lastly the patient did have blood work showing persistently elevated SGOT and SGPT with patient having seen GI with negative workup including serologies and imaging ultimately LFTs returned to normal. GI felt likely it was a viral illness.\par \par July 2017 the patient complained of joint aches and dry eyes with screening rheumatology blood work normal. Therefore patient was referred for rheumatology evaluation which she did not do.\par Her dry eye issue has resolved with changing her contact lenses.\par no significant joint symptoms.\par \par The patient is single and works as a  in [place].\par \par

## 2021-01-31 NOTE — HEALTH RISK ASSESSMENT
[Very Good] : ~his/her~ current health as very good [Yes] : Yes [2 - 4 times a month (2 pts)] : 2-4 times a month (2 points) [3 or 4 (1 pt)] : 3 or 4  (1 point) [Never (0 pts)] : Never (0 points) [No] : In the past 12 months have you used drugs other than those required for medical reasons? No [No falls in past year] : Patient reported no falls in the past year [0] : 2) Feeling down, depressed, or hopeless: Not at all (0) [Patient reported PAP Smear was normal] : Patient reported PAP Smear was normal [None] : None [With Family] : lives with family [Employed] : employed [College] : College [Single] : single [Fully functional (bathing, dressing, toileting, transferring, walking, feeding)] : Fully functional (bathing, dressing, toileting, transferring, walking, feeding) [Fully functional (using the telephone, shopping, preparing meals, housekeeping, doing laundry, using] : Fully functional and needs no help or supervision to perform IADLs (using the telephone, shopping, preparing meals, housekeeping, doing laundry, using transportation, managing medications and managing finances) [Smoke Detector] : smoke detector [Carbon Monoxide Detector] : carbon monoxide detector [Seat Belt] :  uses seat belt [Sunscreen] : uses sunscreen [Excellent] : ~his/her~  mood as  excellent [Designated Healthcare Proxy] : Designated healthcare proxy [Name: ___] : Health Care Proxy's Name: [unfilled]  [] : No [de-identified] : exercises occ [Audit-CScore] : 3 [de-identified] : good [NQU3Gldvg] : 0 [Change in mental status noted] : No change in mental status noted [Sexually Active] : not sexually active [Reports changes in hearing] : Reports no changes in hearing [Reports changes in vision] : Reports no changes in vision [Reports changes in dental health] : Reports no changes in dental health [PapSmearDate] : 12/19 [FreeTextEntry2] :  in [place] [AdvancecareDate] : 01/21

## 2021-01-31 NOTE — ADDENDUM
[FreeTextEntry1] : Blood work other than slightly increased SGOT and SGPT with hemoglobin decreased from 12.4 to 11.3 with normal iron panel.\par followup in one month to recheck LFTs and CBC.

## 2021-01-31 NOTE — ASSESSMENT
[FreeTextEntry1] : 25-year-old female currently medically stable with no chronic medical issues.\par \par Encourage good diet with regular exercise\par \par Influenza vaccine given left deltoid\par \par GYN followup yearly\par \par Venipuncture done today in the office\par \par Followup yearly and as needed\par \par \par

## 2021-02-11 ENCOUNTER — TRANSCRIPTION ENCOUNTER (OUTPATIENT)
Age: 26
End: 2021-02-11

## 2021-02-20 ENCOUNTER — NON-APPOINTMENT (OUTPATIENT)
Age: 26
End: 2021-02-20

## 2021-03-25 ENCOUNTER — APPOINTMENT (OUTPATIENT)
Dept: INTERNAL MEDICINE | Facility: CLINIC | Age: 26
End: 2021-03-25
Payer: COMMERCIAL

## 2021-03-25 VITALS
HEIGHT: 69 IN | WEIGHT: 150 LBS | HEART RATE: 69 BPM | OXYGEN SATURATION: 98 % | BODY MASS INDEX: 22.22 KG/M2 | RESPIRATION RATE: 10 BRPM | TEMPERATURE: 97.8 F | DIASTOLIC BLOOD PRESSURE: 70 MMHG | SYSTOLIC BLOOD PRESSURE: 110 MMHG

## 2021-03-25 DIAGNOSIS — N93.8 OTHER SPECIFIED ABNORMAL UTERINE AND VAGINAL BLEEDING: ICD-10-CM

## 2021-03-25 PROCEDURE — 99072 ADDL SUPL MATRL&STAF TM PHE: CPT

## 2021-03-25 PROCEDURE — 99213 OFFICE O/P EST LOW 20 MIN: CPT | Mod: 25

## 2021-03-25 PROCEDURE — 36415 COLL VENOUS BLD VENIPUNCTURE: CPT

## 2021-03-26 ENCOUNTER — NON-APPOINTMENT (OUTPATIENT)
Age: 26
End: 2021-03-26

## 2021-03-26 LAB
ALBUMIN SERPL ELPH-MCNC: 4.9 G/DL
ALP BLD-CCNC: 85 U/L
ALT SERPL-CCNC: 25 U/L
AST SERPL-CCNC: 28 U/L
BASOPHILS # BLD AUTO: 0.02 K/UL
BASOPHILS NFR BLD AUTO: 0.3 %
BILIRUB DIRECT SERPL-MCNC: 0.1 MG/DL
BILIRUB INDIRECT SERPL-MCNC: 0.3 MG/DL
BILIRUB SERPL-MCNC: 0.4 MG/DL
EOSINOPHIL # BLD AUTO: 0.06 K/UL
EOSINOPHIL NFR BLD AUTO: 1 %
FERRITIN SERPL-MCNC: 14 NG/ML
FOLATE SERPL-MCNC: 13.3 NG/ML
HAV IGM SER QL: NONREACTIVE
HBV CORE IGM SER QL: NONREACTIVE
HBV SURFACE AG SER QL: NONREACTIVE
HCT VFR BLD CALC: 37.4 %
HCV AB SER QL: NONREACTIVE
HCV S/CO RATIO: 0.16 S/CO
HGB BLD-MCNC: 11.4 G/DL
IMM GRANULOCYTES NFR BLD AUTO: 0.2 %
IRON SATN MFR SERPL: 18 %
IRON SERPL-MCNC: 62 UG/DL
LYMPHOCYTES # BLD AUTO: 2.05 K/UL
LYMPHOCYTES NFR BLD AUTO: 34.6 %
MAN DIFF?: NORMAL
MCHC RBC-ENTMCNC: 25.8 PG
MCHC RBC-ENTMCNC: 30.5 GM/DL
MCV RBC AUTO: 84.6 FL
MONOCYTES # BLD AUTO: 0.55 K/UL
MONOCYTES NFR BLD AUTO: 9.3 %
NEUTROPHILS # BLD AUTO: 3.23 K/UL
NEUTROPHILS NFR BLD AUTO: 54.6 %
PLATELET # BLD AUTO: 288 K/UL
PROT SERPL-MCNC: 7.7 G/DL
RBC # BLD: 4.42 M/UL
RBC # FLD: 13.6 %
TIBC SERPL-MCNC: 352 UG/DL
UIBC SERPL-MCNC: 290 UG/DL
VIT B12 SERPL-MCNC: 474 PG/ML
WBC # FLD AUTO: 5.92 K/UL

## 2021-03-26 NOTE — ADDENDUM
[FreeTextEntry1] : Labs show\par -LFTs now normal\par -H&H 11.4/37.4 with low ferritin= to followup with GYN regarding DU B./ taking multivitamin with iron daily

## 2021-03-26 NOTE — HISTORY OF PRESENT ILLNESS
[FreeTextEntry1] : Followup [de-identified] : Pt presents to repeat labs\par Last labs showed\par -H/H of 11.3/37.1 with AST/ALT of 48/47\par -abnormal results d/w pt and questions answered\par -continues on no meds\par -Patient has seen GI in the past for elevated LFTs and had abdominal sonogram 3/2020 which was normal, etiology was not found but LFTs normalized therefore did not followup\par -Patient continues with DU BRachel especially with exercise. Patient states she gets her periods regularly and also continues to have daily vaginal bleeding on the days that she works out. Patient was told to see GYN which she still did not do\par -Patient also complaining of canker sores in her mouth that started a couple of days ago, her last instance was about one month ago

## 2021-03-26 NOTE — PHYSICAL EXAM
[No Acute Distress] : no acute distress [No Respiratory Distress] : no respiratory distress  [Clear to Auscultation] : lungs were clear to auscultation bilaterally [Normal Rate] : normal rate  [Regular Rhythm] : with a regular rhythm [Normal Affect] : the affect was normal [Normal Mood] : the mood was normal [de-identified] : +Canker sore right upper inner lip and left lower inner lip

## 2021-03-26 NOTE — ASSESSMENT
[FreeTextEntry1] : Recommend salt water or baking soda rinses for canker sores.Again told to followup with GYN.Venipuncture done in our office, Labs sent out Patient advised to continue  with diet/exercise and specialists followup. Patient  will return to the office Q year\par \par Dr. Peter was present in office building while I examined patient\par \par \par \par Covid vaccine d/w pt\par MD's\par 1.GYN "to do"\par 2.GI-Dr. Thompson\par abdominal sonogram 3/2020 = normal

## 2021-07-20 ENCOUNTER — TRANSCRIPTION ENCOUNTER (OUTPATIENT)
Age: 26
End: 2021-07-20

## 2021-07-21 ENCOUNTER — NON-APPOINTMENT (OUTPATIENT)
Age: 26
End: 2021-07-21

## 2021-09-13 NOTE — REVIEW OF SYSTEMS
To whom it may concern:    Please excuse pt today from school and work on 9/13/21. He was seen at [facility] for medical reason.     Thank you!    Lou MITCHELL   [Negative] : Heme/Lymph

## 2022-01-25 ENCOUNTER — NON-APPOINTMENT (OUTPATIENT)
Age: 27
End: 2022-01-25

## 2022-01-25 ENCOUNTER — APPOINTMENT (OUTPATIENT)
Dept: INTERNAL MEDICINE | Facility: CLINIC | Age: 27
End: 2022-01-25
Payer: COMMERCIAL

## 2022-01-25 VITALS
HEART RATE: 65 BPM | DIASTOLIC BLOOD PRESSURE: 70 MMHG | WEIGHT: 147 LBS | RESPIRATION RATE: 11 BRPM | SYSTOLIC BLOOD PRESSURE: 120 MMHG | HEIGHT: 69 IN | TEMPERATURE: 97.2 F | BODY MASS INDEX: 21.77 KG/M2

## 2022-01-25 DIAGNOSIS — Z92.29 PERSONAL HISTORY OF OTHER DRUG THERAPY: ICD-10-CM

## 2022-01-25 DIAGNOSIS — Z13.39 ENCOUNTER FOR SCREENING EXAM FOR OTHER MENTAL HEALTH AND BEHAVIORAL DISORDERS: ICD-10-CM

## 2022-01-25 DIAGNOSIS — R20.0 ANESTHESIA OF SKIN: ICD-10-CM

## 2022-01-25 DIAGNOSIS — K12.0 RECURRENT ORAL APHTHAE: ICD-10-CM

## 2022-01-25 PROCEDURE — G0442 ANNUAL ALCOHOL SCREEN 15 MIN: CPT | Mod: NC

## 2022-01-25 PROCEDURE — 99395 PREV VISIT EST AGE 18-39: CPT | Mod: 25

## 2022-01-25 PROCEDURE — 93000 ELECTROCARDIOGRAM COMPLETE: CPT | Mod: 59

## 2022-01-25 PROCEDURE — G0444 DEPRESSION SCREEN ANNUAL: CPT | Mod: NC,59

## 2022-01-25 PROCEDURE — 36415 COLL VENOUS BLD VENIPUNCTURE: CPT

## 2022-01-25 NOTE — PHYSICAL EXAM
[General Appearance - In No Acute Distress] : in no acute distress [PERRL With Normal Accommodation] : pupils were equal in size, round, and reactive to light [Extraocular Movements] : extraocular movements were intact [Both Tympanic Membranes Were Examined] : both tympanic membranes were normal [Oropharynx] : the oropharynx was normal [Neck Cervical Mass (___cm)] : no neck mass was observed [] : no respiratory distress [Respiration, Rhythm And Depth] : normal respiratory rhythm and effort [Auscultation Breath Sounds / Voice Sounds] : lungs were clear to auscultation bilaterally [Heart Rate And Rhythm] : heart rate was normal and rhythm regular [Arterial Pulses Carotid] : carotid pulses were normal with no bruits [Full Pulse] : the pedal pulses are present [Edema] : there was no peripheral edema [Breast Appearance] : normal in appearance [Breast Palpation Mass] : no palpable masses [Breast Abnormal Lactation (Galactorrhea)] : no nipple discharge [Bowel Sounds] : normal bowel sounds [Abdomen Soft] : soft [Abdomen Tenderness] : non-tender [Abdomen Mass (___ Cm)] : no abdominal mass palpated [No CVA Tenderness] : no ~M costovertebral angle tenderness [No Spinal Tenderness] : no spinal tenderness [Abnormal Walk] : normal gait [Involuntary Movements] : no involuntary movements were seen [Motor Tone] : muscle strength and tone were normal [Skin Color & Pigmentation] : normal skin color and pigmentation [Cranial Nerves] : cranial nerves 2-12 were intact [No Focal Deficits] : no focal deficits [Oriented To Time, Place, And Person] : oriented to person, place, and time [Affect] : the affect was normal [Mood] : the mood was normal [FreeTextEntry1] : VIA GYN [Over the Past 2 Weeks, Have You Felt Down, Depressed, or Hopeless?] : 1.) Over the past 2 weeks, have you felt down, depressed, or hopeless? No [Over the Past 2 Weeks, Have You Felt Little Interest or Pleasure Doing Things?] : 2.) Over the past 2 weeks, have you felt little interest or pleasure doing things? No

## 2022-01-25 NOTE — HEALTH RISK ASSESSMENT
[Never] : Never [Yes] : Yes [2 - 4 times a month (2 pts)] : 2-4 times a month (2 points) [3 or 4 (1 pt)] : 3 or 4  (1 point) [Never (0 pts)] : Never (0 points) [No] : In the past 12 months have you used drugs other than those required for medical reasons? No [No falls in past year] : Patient reported no falls in the past year [0] : 2) Feeling down, depressed, or hopeless: Not at all (0) [Patient reported PAP Smear was normal] : Patient reported PAP Smear was normal [None] : None [With Family] : lives with family [Employed] : employed [College] : College [Single] : single [Fully functional (bathing, dressing, toileting, transferring, walking, feeding)] : Fully functional (bathing, dressing, toileting, transferring, walking, feeding) [Fully functional (using the telephone, shopping, preparing meals, housekeeping, doing laundry, using] : Fully functional and needs no help or supervision to perform IADLs (using the telephone, shopping, preparing meals, housekeeping, doing laundry, using transportation, managing medications and managing finances) [Smoke Detector] : smoke detector [Carbon Monoxide Detector] : carbon monoxide detector [Seat Belt] :  uses seat belt [Sunscreen] : uses sunscreen [Excellent] : ~his/her~ current health as excellent [PHQ-2 Negative - No further assessment needed] : PHQ-2 Negative - No further assessment needed [Designated Healthcare Proxy] : Designated healthcare proxy [Name: ___] : Health Care Proxy's Name: [unfilled]  [Audit-CScore] : 3 [de-identified] : exercises occ [de-identified] : good [[id number]] : 0 [Change in mental status noted] : No change in mental status noted [Sexually Active] : not sexually active [Reports changes in hearing] : Reports no changes in hearing [Reports changes in vision] : Reports no changes in vision [Reports changes in dental health] : Reports no changes in dental health [PapSmearDate] : 02/21 [FreeTextEntry2] :  in [place] [AdvancecareDate] : 01/22

## 2022-01-25 NOTE — HISTORY OF PRESENT ILLNESS
[FreeTextEntry1] : 26-year-old female presents for routine physical exam.. \par \par Patient has no active medical problems, on no daily medications. \par \par He did have issues with dysfunction uterine bleeding and has seen gynecology with testing including pelvic ultrasound negative.She continued with issues if also a different gynecologist who did more invasive diagnostic testing finding uterine polyps and had a D&C June 2021 with no further abnormal bleeding.\par As a result she is on hormone replacement therapy\par \par The patient is asymptomatic denying chest pain, palpitations, shortness of breath or edema.\par \par She had blood work showing persistently elevated SGOT and SGPT with patient having seen GI with negative workup including serologies and imaging ultimately LFTs returned to normal. GI felt likely it was a viral illness.\par \par July 2017 the patient complained of joint aches and dry eyes with screening rheumatology blood work normal. Therefore patient was referred for rheumatology evaluation which she did not do.\par Her dry eye issue has resolved with changing her contact lenses.\par no significant joint symptoms.\par \par The patient is single with significant other and works as a  in [employer].\par She continues to live at home with her parents\par \par

## 2022-01-25 NOTE — ASSESSMENT
[FreeTextEntry1] : 26-year-old female currently medically stable with no chronic medical issues.\par \par Patient with dysfunction uterine bleeding with GYN evaluation finding uterine polyps status post D&C June 2021 with resolution of issues.\par \par Encourage good diet with regular exercise\par \par Influenza and COVID  vaccines declined \par \par GYN followup yearly\par \par Venipuncture done today in the office\par \par Followup yearly and as needed\par \par \par

## 2022-01-26 ENCOUNTER — NON-APPOINTMENT (OUTPATIENT)
Age: 27
End: 2022-01-26

## 2022-01-26 LAB
ALBUMIN SERPL ELPH-MCNC: 4.9 G/DL
ALP BLD-CCNC: 72 U/L
ALT SERPL-CCNC: 30 U/L
ANION GAP SERPL CALC-SCNC: 15 MMOL/L
AST SERPL-CCNC: 26 U/L
BASOPHILS # BLD AUTO: 0.02 K/UL
BASOPHILS NFR BLD AUTO: 0.3 %
BILIRUB SERPL-MCNC: 0.5 MG/DL
BUN SERPL-MCNC: 9 MG/DL
CALCIUM SERPL-MCNC: 9.8 MG/DL
CHLORIDE SERPL-SCNC: 99 MMOL/L
CHOLEST SERPL-MCNC: 192 MG/DL
CO2 SERPL-SCNC: 22 MMOL/L
CREAT SERPL-MCNC: 0.74 MG/DL
EOSINOPHIL # BLD AUTO: 0.09 K/UL
EOSINOPHIL NFR BLD AUTO: 1.5 %
GLUCOSE SERPL-MCNC: 77 MG/DL
HCT VFR BLD CALC: 37.2 %
HDLC SERPL-MCNC: 55 MG/DL
HGB BLD-MCNC: 11.7 G/DL
IMM GRANULOCYTES NFR BLD AUTO: 0.2 %
LDLC SERPL CALC-MCNC: 117 MG/DL
LYMPHOCYTES # BLD AUTO: 2.04 K/UL
LYMPHOCYTES NFR BLD AUTO: 33 %
MAN DIFF?: NORMAL
MCHC RBC-ENTMCNC: 27.6 PG
MCHC RBC-ENTMCNC: 31.5 GM/DL
MCV RBC AUTO: 87.7 FL
MONOCYTES # BLD AUTO: 0.47 K/UL
MONOCYTES NFR BLD AUTO: 7.6 %
NEUTROPHILS # BLD AUTO: 3.55 K/UL
NEUTROPHILS NFR BLD AUTO: 57.4 %
NONHDLC SERPL-MCNC: 138 MG/DL
PLATELET # BLD AUTO: 254 K/UL
POTASSIUM SERPL-SCNC: 3.8 MMOL/L
PROT SERPL-MCNC: 7.6 G/DL
RBC # BLD: 4.24 M/UL
RBC # FLD: 13.9 %
SODIUM SERPL-SCNC: 137 MMOL/L
TRIGL SERPL-MCNC: 102 MG/DL
WBC # FLD AUTO: 6.18 K/UL

## 2022-04-11 PROBLEM — Z11.59 SCREENING FOR VIRAL DISEASE: Status: ACTIVE | Noted: 2021-01-28

## 2022-12-14 ENCOUNTER — NON-APPOINTMENT (OUTPATIENT)
Age: 27
End: 2022-12-14

## 2022-12-16 ENCOUNTER — NON-APPOINTMENT (OUTPATIENT)
Age: 27
End: 2022-12-16

## 2022-12-25 ENCOUNTER — NON-APPOINTMENT (OUTPATIENT)
Age: 27
End: 2022-12-25

## 2022-12-29 ENCOUNTER — NON-APPOINTMENT (OUTPATIENT)
Age: 27
End: 2022-12-29

## 2022-12-31 ENCOUNTER — NON-APPOINTMENT (OUTPATIENT)
Age: 27
End: 2022-12-31

## 2023-01-31 ENCOUNTER — NON-APPOINTMENT (OUTPATIENT)
Age: 28
End: 2023-01-31

## 2023-01-31 ENCOUNTER — APPOINTMENT (OUTPATIENT)
Dept: INTERNAL MEDICINE | Facility: CLINIC | Age: 28
End: 2023-01-31
Payer: COMMERCIAL

## 2023-01-31 VITALS
WEIGHT: 166 LBS | BODY MASS INDEX: 24.59 KG/M2 | HEART RATE: 69 BPM | SYSTOLIC BLOOD PRESSURE: 110 MMHG | OXYGEN SATURATION: 98 % | RESPIRATION RATE: 11 BRPM | DIASTOLIC BLOOD PRESSURE: 70 MMHG | HEIGHT: 69 IN

## 2023-01-31 DIAGNOSIS — Z13.31 ENCOUNTER FOR SCREENING FOR DEPRESSION: ICD-10-CM

## 2023-01-31 DIAGNOSIS — Z13.6 ENCOUNTER FOR SCREENING FOR CARDIOVASCULAR DISORDERS: ICD-10-CM

## 2023-01-31 PROCEDURE — G0444 DEPRESSION SCREEN ANNUAL: CPT | Mod: NC,59

## 2023-01-31 PROCEDURE — 99395 PREV VISIT EST AGE 18-39: CPT | Mod: 25

## 2023-01-31 PROCEDURE — 93000 ELECTROCARDIOGRAM COMPLETE: CPT | Mod: 59

## 2023-01-31 PROCEDURE — 36415 COLL VENOUS BLD VENIPUNCTURE: CPT

## 2023-01-31 NOTE — ASSESSMENT
[FreeTextEntry1] : 27-year-old female currently medically stable with no chronic medical issues.\par \par Patient with dysfunction uterine bleeding with GYN evaluation finding uterine polyps status post D&C June 2021 with resolution of issues.\par \par Recurrent trapezius muscle spasms therefore referral for massage therapy\par \par Encourage good diet with regular exercise\par \par Influenza and COVID  vaccines declined \par \par GYN followup yearly\par \par Venipuncture done today in the office\par \par Followup yearly and as needed\par \par \par

## 2023-01-31 NOTE — HISTORY OF PRESENT ILLNESS
[FreeTextEntry1] : 27-year-old female presents for routine physical exam.. \par \par Patient has no active medical problems, on no daily medications. \par \par She did have issues with dysfunction uterine bleeding and has seen gynecology with testing including pelvic ultrasound negative.She continued with issues so saw a different gynecologist who did more invasive diagnostic testing finding uterine polyps and had a D&C June 2021 with no further abnormal bleeding.\par As a result she is on hormone replacement therapy\par \par The patient is asymptomatic denying chest pain, palpitations, shortness of breath or edema.\par Her only complaint is recurrent trapezius muscle spasms with pain for which she goes to massage therapy with benefit.\par \par She had blood work showing persistently elevated SGOT and SGPT with patient having seen GI with negative workup including serologies and imaging ultimately LFTs returned to normal. GI felt likely it was a viral illness.\par \par July 2017 the patient complained of joint aches and dry eyes with screening rheumatology blood work normal. Therefore patient was referred for rheumatology evaluation which she did not do.\par Her dry eye issue has resolved with changing her contact lenses.\par no significant joint symptoms.\par \par The patient is single with significant other and works as a  in [employer].\par She continues to live at home with her parents\par \par

## 2023-01-31 NOTE — HEALTH RISK ASSESSMENT
[Never] : Never [Yes] : Yes [2 - 4 times a month (2 pts)] : 2-4 times a month (2 points) [3 or 4 (1 pt)] : 3 or 4  (1 point) [Never (0 pts)] : Never (0 points) [No] : In the past 12 months have you used drugs other than those required for medical reasons? No [No falls in past year] : Patient reported no falls in the past year [0] : 2) Feeling down, depressed, or hopeless: Not at all (0) [PHQ-2 Negative - No further assessment needed] : PHQ-2 Negative - No further assessment needed [Patient reported PAP Smear was normal] : Patient reported PAP Smear was normal [None] : None [With Family] : lives with family [Employed] : employed [College] : College [Single] : single [Fully functional (bathing, dressing, toileting, transferring, walking, feeding)] : Fully functional (bathing, dressing, toileting, transferring, walking, feeding) [Fully functional (using the telephone, shopping, preparing meals, housekeeping, doing laundry, using] : Fully functional and needs no help or supervision to perform IADLs (using the telephone, shopping, preparing meals, housekeeping, doing laundry, using transportation, managing medications and managing finances) [Smoke Detector] : smoke detector [Carbon Monoxide Detector] : carbon monoxide detector [Seat Belt] :  uses seat belt [Sunscreen] : uses sunscreen [Designated Healthcare Proxy] : Designated healthcare proxy [Name: ___] : Health Care Proxy's Name: [unfilled]  [Very Good] : ~his/her~  mood as very good [Audit-CScore] : 3 [de-identified] : exercises occ [de-identified] : good [PHF4Onnuv] : 0 [Change in mental status noted] : No change in mental status noted [Sexually Active] : not sexually active [Reports changes in hearing] : Reports no changes in hearing [Reports changes in vision] : Reports no changes in vision [Reports changes in dental health] : Reports no changes in dental health [PapSmearDate] : 08/22 [FreeTextEntry2] :  in [place] [AdvancecareDate] : 01/23

## 2023-02-01 ENCOUNTER — NON-APPOINTMENT (OUTPATIENT)
Age: 28
End: 2023-02-01

## 2023-02-01 ENCOUNTER — TRANSCRIPTION ENCOUNTER (OUTPATIENT)
Age: 28
End: 2023-02-01

## 2023-02-01 LAB
ALBUMIN SERPL ELPH-MCNC: 4.9 G/DL
ALP BLD-CCNC: 77 U/L
ALT SERPL-CCNC: 31 U/L
ANION GAP SERPL CALC-SCNC: 15 MMOL/L
APPEARANCE: CLEAR
AST SERPL-CCNC: 34 U/L
BACTERIA: NEGATIVE
BASOPHILS # BLD AUTO: 0.04 K/UL
BASOPHILS NFR BLD AUTO: 0.6 %
BILIRUB SERPL-MCNC: 0.5 MG/DL
BILIRUBIN URINE: NEGATIVE
BLOOD URINE: NEGATIVE
BUN SERPL-MCNC: 9 MG/DL
CALCIUM SERPL-MCNC: 10.3 MG/DL
CHLORIDE SERPL-SCNC: 99 MMOL/L
CHOLEST SERPL-MCNC: 199 MG/DL
CO2 SERPL-SCNC: 24 MMOL/L
COLOR: COLORLESS
CREAT SERPL-MCNC: 0.73 MG/DL
EGFR: 115 ML/MIN/1.73M2
EOSINOPHIL # BLD AUTO: 0.11 K/UL
EOSINOPHIL NFR BLD AUTO: 1.6 %
GLUCOSE QUALITATIVE U: NEGATIVE
GLUCOSE SERPL-MCNC: 78 MG/DL
HCT VFR BLD CALC: 40.2 %
HDLC SERPL-MCNC: 54 MG/DL
HGB BLD-MCNC: 12.9 G/DL
HYALINE CASTS: 0 /LPF
IMM GRANULOCYTES NFR BLD AUTO: 0.3 %
KETONES URINE: NEGATIVE
LDLC SERPL CALC-MCNC: 115 MG/DL
LEUKOCYTE ESTERASE URINE: NEGATIVE
LYMPHOCYTES # BLD AUTO: 2.33 K/UL
LYMPHOCYTES NFR BLD AUTO: 33.7 %
MAN DIFF?: NORMAL
MCHC RBC-ENTMCNC: 27.7 PG
MCHC RBC-ENTMCNC: 32.1 GM/DL
MCV RBC AUTO: 86.5 FL
MICROSCOPIC-UA: NORMAL
MONOCYTES # BLD AUTO: 0.46 K/UL
MONOCYTES NFR BLD AUTO: 6.7 %
NEUTROPHILS # BLD AUTO: 3.95 K/UL
NEUTROPHILS NFR BLD AUTO: 57.1 %
NITRITE URINE: NEGATIVE
NONHDLC SERPL-MCNC: 145 MG/DL
PH URINE: 6.5
PLATELET # BLD AUTO: 307 K/UL
POTASSIUM SERPL-SCNC: 4.6 MMOL/L
PROT SERPL-MCNC: 8 G/DL
PROTEIN URINE: NEGATIVE
RBC # BLD: 4.65 M/UL
RBC # FLD: 13.7 %
RED BLOOD CELLS URINE: 0 /HPF
SODIUM SERPL-SCNC: 138 MMOL/L
SPECIFIC GRAVITY URINE: 1.01
SQUAMOUS EPITHELIAL CELLS: 3 /HPF
TRIGL SERPL-MCNC: 148 MG/DL
UROBILINOGEN URINE: NORMAL
WBC # FLD AUTO: 6.91 K/UL
WHITE BLOOD CELLS URINE: 0 /HPF

## 2023-03-24 ENCOUNTER — NON-APPOINTMENT (OUTPATIENT)
Age: 28
End: 2023-03-24

## 2023-03-25 ENCOUNTER — NON-APPOINTMENT (OUTPATIENT)
Age: 28
End: 2023-03-25

## 2023-03-26 ENCOUNTER — NON-APPOINTMENT (OUTPATIENT)
Age: 28
End: 2023-03-26

## 2023-07-06 ENCOUNTER — APPOINTMENT (OUTPATIENT)
Dept: INTERNAL MEDICINE | Facility: CLINIC | Age: 28
End: 2023-07-06
Payer: COMMERCIAL

## 2023-07-06 VITALS
RESPIRATION RATE: 12 BRPM | HEIGHT: 69 IN | HEART RATE: 124 BPM | WEIGHT: 168 LBS | DIASTOLIC BLOOD PRESSURE: 72 MMHG | TEMPERATURE: 99.2 F | OXYGEN SATURATION: 98 % | BODY MASS INDEX: 24.88 KG/M2 | SYSTOLIC BLOOD PRESSURE: 108 MMHG

## 2023-07-06 DIAGNOSIS — R74.8 ABNORMAL LEVELS OF OTHER SERUM ENZYMES: ICD-10-CM

## 2023-07-06 PROCEDURE — 87880 STREP A ASSAY W/OPTIC: CPT | Mod: QW

## 2023-07-06 PROCEDURE — 36415 COLL VENOUS BLD VENIPUNCTURE: CPT

## 2023-07-06 PROCEDURE — 99213 OFFICE O/P EST LOW 20 MIN: CPT | Mod: 25

## 2023-07-07 LAB — S PYO AG SPEC QL IA: NEGATIVE

## 2023-07-09 LAB
ALBUMIN SERPL ELPH-MCNC: 4.9 G/DL
ALP BLD-CCNC: 110 U/L
ALT SERPL-CCNC: 40 U/L
ANION GAP SERPL CALC-SCNC: 12 MMOL/L
AST SERPL-CCNC: 47 U/L
BACTERIA THROAT CULT: NORMAL
BILIRUB SERPL-MCNC: 0.4 MG/DL
BUN SERPL-MCNC: 10 MG/DL
CALCIUM SERPL-MCNC: 9.6 MG/DL
CHLORIDE SERPL-SCNC: 102 MMOL/L
CO2 SERPL-SCNC: 24 MMOL/L
CREAT SERPL-MCNC: 0.93 MG/DL
EGFR: 86 ML/MIN/1.73M2
GLUCOSE SERPL-MCNC: 100 MG/DL
HETEROPH AB SER QL: NEGATIVE
POTASSIUM SERPL-SCNC: 4.1 MMOL/L
PROT SERPL-MCNC: 7.7 G/DL
SODIUM SERPL-SCNC: 138 MMOL/L

## 2023-07-10 ENCOUNTER — NON-APPOINTMENT (OUTPATIENT)
Age: 28
End: 2023-07-10

## 2023-07-10 ENCOUNTER — TRANSCRIPTION ENCOUNTER (OUTPATIENT)
Age: 28
End: 2023-07-10

## 2023-07-10 PROBLEM — R74.8 INCREASED LIVER ENZYMES: Status: RESOLVED | Noted: 2020-01-21 | Resolved: 2023-07-10

## 2023-07-10 NOTE — ADDENDUM
[FreeTextEntry1] : CBC normal with CMP normal other than slightly increased SGOT at 47\par Heterophile negative\par Throat culture negative\par \par Patient called and informed of results and states she is feeling much better.\par Follow-up LFTs in 1 month.

## 2023-07-10 NOTE — PHYSICAL EXAM
[No Acute Distress] : no acute distress [Normal Sclera/Conjunctiva] : normal sclera/conjunctiva [Normal Outer Ear/Nose] : the outer ears and nose were normal in appearance [Normal TMs] : both tympanic membranes were normal [No Respiratory Distress] : no respiratory distress  [No Accessory Muscle Use] : no accessory muscle use [Clear to Auscultation] : lungs were clear to auscultation bilaterally [Normal Rate] : normal rate  [Regular Rhythm] : with a regular rhythm [Soft] : abdomen soft [Non Tender] : non-tender [Non-distended] : non-distended [No Masses] : no abdominal mass palpated [No HSM] : no HSM [No Focal Deficits] : no focal deficits [Normal Affect] : the affect was normal [de-identified] : Mildly ill-appearing [de-identified] : Positive injection left greater than right tonsil with left grayish exudate [de-identified] : Tender cervical adenopathy

## 2023-07-10 NOTE — HISTORY OF PRESENT ILLNESS
[FreeTextEntry8] : The patient presents for an acute visit secondary to not feeling well started yesterday when she had a fever and slight sore throat which worsened from yesterday to today with temperature up to 102 degrees with increasing sore throat and noticing whitish abnormality on her left tonsil.  Also glands of her neck are tender.  Left ear ache.\par No cough, shortness of breath, abdominal pain or GI symptoms.\par Tylenol helps her fever.\par COVID test today negative

## 2023-07-10 NOTE — ASSESSMENT
[FreeTextEntry1] : Patient with acute tonsillitis with exudate with rapid negative strep test raising possibility of other etiologies such as mono.\par Tonsil C&S done and sent to lab.\par Venipuncture done today in the office to check CMP, CBC and mono screening.\par Plenty of rest and fluids with symptomatic treatment including Advil/Tylenol.

## 2023-09-04 ENCOUNTER — NON-APPOINTMENT (OUTPATIENT)
Age: 28
End: 2023-09-04

## 2023-12-26 ENCOUNTER — NON-APPOINTMENT (OUTPATIENT)
Age: 28
End: 2023-12-26

## 2023-12-28 ENCOUNTER — NON-APPOINTMENT (OUTPATIENT)
Age: 28
End: 2023-12-28

## 2024-01-26 RX ORDER — NORETHINDRONE ACETATE AND ETHINYL ESTRADIOL AND FERROUS FUMARATE 1MG-20(21)
1-20 KIT ORAL DAILY
Qty: 1 | Refills: 0 | Status: ACTIVE | COMMUNITY
Start: 2024-01-26

## 2024-01-26 RX ORDER — NORETHINDRONE ACETATE AND ETHINYL ESTRADIOL 1; 20 MG/1; UG/1
1-20 TABLET ORAL DAILY
Refills: 0 | Status: DISCONTINUED | COMMUNITY
Start: 2022-01-25 | End: 2024-01-26

## 2024-02-01 ENCOUNTER — APPOINTMENT (OUTPATIENT)
Dept: INTERNAL MEDICINE | Facility: CLINIC | Age: 29
End: 2024-02-01
Payer: COMMERCIAL

## 2024-02-01 VITALS
WEIGHT: 167 LBS | OXYGEN SATURATION: 100 % | HEART RATE: 78 BPM | DIASTOLIC BLOOD PRESSURE: 70 MMHG | SYSTOLIC BLOOD PRESSURE: 110 MMHG | BODY MASS INDEX: 24.73 KG/M2 | RESPIRATION RATE: 12 BRPM | HEIGHT: 69 IN

## 2024-02-01 DIAGNOSIS — M62.838 OTHER MUSCLE SPASM: ICD-10-CM

## 2024-02-01 DIAGNOSIS — R52 PAIN, UNSPECIFIED: ICD-10-CM

## 2024-02-01 DIAGNOSIS — Z00.00 ENCOUNTER FOR GENERAL ADULT MEDICAL EXAMINATION W/OUT ABNORMAL FINDINGS: ICD-10-CM

## 2024-02-01 DIAGNOSIS — Z23 ENCOUNTER FOR IMMUNIZATION: ICD-10-CM

## 2024-02-01 DIAGNOSIS — Z87.898 PERSONAL HISTORY OF OTHER SPECIFIED CONDITIONS: ICD-10-CM

## 2024-02-01 DIAGNOSIS — R74.8 ABNORMAL LEVELS OF OTHER SERUM ENZYMES: ICD-10-CM

## 2024-02-01 DIAGNOSIS — Z86.2 PERSONAL HISTORY OF DISEASES OF THE BLOOD AND BLOOD-FORMING ORGANS AND CERTAIN DISORDERS INVOLVING THE IMMUNE MECHANISM: ICD-10-CM

## 2024-02-01 DIAGNOSIS — J03.90 ACUTE TONSILLITIS, UNSPECIFIED: ICD-10-CM

## 2024-02-01 PROCEDURE — 36415 COLL VENOUS BLD VENIPUNCTURE: CPT

## 2024-02-01 PROCEDURE — 99395 PREV VISIT EST AGE 18-39: CPT | Mod: 25

## 2024-02-01 PROCEDURE — G0008: CPT

## 2024-02-01 PROCEDURE — 90686 IIV4 VACC NO PRSV 0.5 ML IM: CPT

## 2024-02-02 DIAGNOSIS — D64.9 ANEMIA, UNSPECIFIED: ICD-10-CM

## 2024-02-03 LAB
ALBUMIN SERPL ELPH-MCNC: 4.6 G/DL
ALP BLD-CCNC: 89 U/L
ALT SERPL-CCNC: 46 U/L
ANION GAP SERPL CALC-SCNC: 13 MMOL/L
APPEARANCE: CLEAR
AST SERPL-CCNC: 37 U/L
BACTERIA: ABNORMAL /HPF
BASOPHILS # BLD AUTO: 0.03 K/UL
BASOPHILS NFR BLD AUTO: 0.4 %
BILIRUB SERPL-MCNC: 0.6 MG/DL
BILIRUBIN URINE: NEGATIVE
BLOOD URINE: NEGATIVE
BUN SERPL-MCNC: 12 MG/DL
CALCIUM SERPL-MCNC: 9.4 MG/DL
CAST: 1 /LPF
CHLORIDE SERPL-SCNC: 101 MMOL/L
CHOLEST SERPL-MCNC: 187 MG/DL
CO2 SERPL-SCNC: 23 MMOL/L
COLOR: YELLOW
CREAT SERPL-MCNC: 0.75 MG/DL
EGFR: 110 ML/MIN/1.73M2
EOSINOPHIL # BLD AUTO: 0.13 K/UL
EOSINOPHIL NFR BLD AUTO: 1.9 %
EPITHELIAL CELLS: 17 /HPF
FERRITIN SERPL-MCNC: 45 NG/ML
FOLATE SERPL-MCNC: 13.7 NG/ML
GLUCOSE QUALITATIVE U: NEGATIVE MG/DL
GLUCOSE SERPL-MCNC: 78 MG/DL
HCT VFR BLD CALC: 34.4 %
HDLC SERPL-MCNC: 54 MG/DL
HGB BLD-MCNC: 11.4 G/DL
IMM GRANULOCYTES NFR BLD AUTO: 0.3 %
IRON SATN MFR SERPL: 43 %
IRON SERPL-MCNC: 160 UG/DL
KETONES URINE: NEGATIVE MG/DL
LDLC SERPL CALC-MCNC: 113 MG/DL
LEUKOCYTE ESTERASE URINE: NEGATIVE
LYMPHOCYTES # BLD AUTO: 1.99 K/UL
LYMPHOCYTES NFR BLD AUTO: 29.4 %
MAN DIFF?: NORMAL
MCHC RBC-ENTMCNC: 27.7 PG
MCHC RBC-ENTMCNC: 33.1 GM/DL
MCV RBC AUTO: 83.5 FL
MICROSCOPIC-UA: NORMAL
MONOCYTES # BLD AUTO: 0.43 K/UL
MONOCYTES NFR BLD AUTO: 6.3 %
NEUTROPHILS # BLD AUTO: 4.18 K/UL
NEUTROPHILS NFR BLD AUTO: 61.7 %
NITRITE URINE: NEGATIVE
NONHDLC SERPL-MCNC: 133 MG/DL
PH URINE: 6
PLATELET # BLD AUTO: 231 K/UL
POTASSIUM SERPL-SCNC: 3.7 MMOL/L
PROT SERPL-MCNC: 7.2 G/DL
PROTEIN URINE: NEGATIVE MG/DL
RBC # BLD: 4.12 M/UL
RBC # FLD: 13.2 %
RED BLOOD CELLS URINE: 0 /HPF
SODIUM SERPL-SCNC: 137 MMOL/L
SPECIFIC GRAVITY URINE: 1.01
TIBC SERPL-MCNC: 373 UG/DL
TRIGL SERPL-MCNC: 109 MG/DL
UIBC SERPL-MCNC: 213 UG/DL
UROBILINOGEN URINE: 0.2 MG/DL
VIT B12 SERPL-MCNC: 338 PG/ML
WBC # FLD AUTO: 6.78 K/UL
WHITE BLOOD CELLS URINE: 4 /HPF

## 2024-02-03 NOTE — PHYSICAL EXAM
[General Appearance - In No Acute Distress] : in no acute distress [PERRL With Normal Accommodation] : pupils were equal in size, round, and reactive to light [Extraocular Movements] : extraocular movements were intact [Oropharynx] : the oropharynx was normal [Both Tympanic Membranes Were Examined] : both tympanic membranes were normal [Neck Cervical Mass (___cm)] : no neck mass was observed [] : no respiratory distress [Respiration, Rhythm And Depth] : normal respiratory rhythm and effort [Auscultation Breath Sounds / Voice Sounds] : lungs were clear to auscultation bilaterally [Heart Rate And Rhythm] : heart rate was normal and rhythm regular [Arterial Pulses Carotid] : carotid pulses were normal with no bruits [Full Pulse] : the pedal pulses are present [Edema] : there was no peripheral edema [Breast Appearance] : normal in appearance [Breast Palpation Mass] : no palpable masses [Breast Abnormal Lactation (Galactorrhea)] : no nipple discharge [Bowel Sounds] : normal bowel sounds [Abdomen Soft] : soft [Abdomen Tenderness] : non-tender [Abdomen Mass (___ Cm)] : no abdominal mass palpated [No CVA Tenderness] : no ~M costovertebral angle tenderness [No Spinal Tenderness] : no spinal tenderness [Abnormal Walk] : normal gait [Involuntary Movements] : no involuntary movements were seen [Motor Tone] : muscle strength and tone were normal [Skin Color & Pigmentation] : normal skin color and pigmentation [Cranial Nerves] : cranial nerves 2-12 were intact [No Focal Deficits] : no focal deficits [Oriented To Time, Place, And Person] : oriented to person, place, and time [Affect] : the affect was normal [Mood] : the mood was normal [FreeTextEntry1] : VIA GYN [Over the Past 2 Weeks, Have You Felt Down, Depressed, or Hopeless?] : 1.) Over the past 2 weeks, have you felt down, depressed, or hopeless? No [Over the Past 2 Weeks, Have You Felt Little Interest or Pleasure Doing Things?] : 2.) Over the past 2 weeks, have you felt little interest or pleasure doing things? No

## 2024-02-03 NOTE — ADDENDUM
[FreeTextEntry1] : Labs good other than mildly decreased hemoglobin at 11.4 with normal B12, folate and iron studies.  Likely related to menstrual flow.  Recommend starting multivitamin with iron daily. Also slightly increased SGPT at 46 likely not significant but needs to be rechecked. Follow-up in 1 month to recheck CBC and liver panel.

## 2024-02-03 NOTE — ASSESSMENT
[FreeTextEntry1] : 28-year-old female currently medically stable with no chronic medical issues.  Patient with dysfunction uterine bleeding with GYN evaluation finding uterine polyps status post D&C June 2021 with resolution of issues.  Recurrent trapezius muscle spasm therefore referral given for massage therapy  Encourage good diet with regular exercise  COVID  vaccines declined  Influenza vaccine given left deltoid  GYN followup yearly  Venipuncture done today in the office  Followup yearly and as needed

## 2024-02-03 NOTE — HISTORY OF PRESENT ILLNESS
[FreeTextEntry1] : 28-year-old female presents for routine physical exam..   Patient has no active medical problems, on no daily medications.   She did have issues with dysfunction uterine bleeding and has seen gynecology with testing including pelvic ultrasound negative.She continued with issues so saw a different gynecologist who did more invasive diagnostic testing finding uterine polyps and had a D&C June 2021 with no further abnormal bleeding. As a result she is on hormone replacement therapy  The patient is asymptomatic denying chest pain, palpitations, shortness of breath or edema.  She had blood work showing persistently elevated SGOT and SGPT with patient having seen GI with negative workup including serologies and imaging ultimately LFTs returned to normal. GI felt likely it was a viral illness.  July 2017 the patient complained of joint aches and dry eyes with screening rheumatology blood work normal. Therefore patient was referred for rheumatology evaluation which she did not do. Her dry eye issue has resolved with changing her contact lenses. no significant joint symptoms.  She does get chronic trapezius muscle spasms for which she gets massage therapy with benefit.  Requests new referral for massage therapy.  The patient is single with significant other and works as a  in [employer]. She continues to live at home with her parents

## 2024-02-03 NOTE — HEALTH RISK ASSESSMENT
[Very Good] : ~his/her~ current health as very good [Yes] : Yes [2 - 4 times a month (2 pts)] : 2-4 times a month (2 points) [3 or 4 (1 pt)] : 3 or 4  (1 point) [Never (0 pts)] : Never (0 points) [No] : In the past 12 months have you used drugs other than those required for medical reasons? No [No falls in past year] : Patient reported no falls in the past year [0] : 2) Feeling down, depressed, or hopeless: Not at all (0) [PHQ-2 Negative - No further assessment needed] : PHQ-2 Negative - No further assessment needed [Patient reported PAP Smear was normal] : Patient reported PAP Smear was normal [None] : None [With Family] : lives with family [Employed] : employed [College] : College [Single] : single [Fully functional (bathing, dressing, toileting, transferring, walking, feeding)] : Fully functional (bathing, dressing, toileting, transferring, walking, feeding) [Fully functional (using the telephone, shopping, preparing meals, housekeeping, doing laundry, using] : Fully functional and needs no help or supervision to perform IADLs (using the telephone, shopping, preparing meals, housekeeping, doing laundry, using transportation, managing medications and managing finances) [Smoke Detector] : smoke detector [Carbon Monoxide Detector] : carbon monoxide detector [Seat Belt] :  uses seat belt [Sunscreen] : uses sunscreen [Designated Healthcare Proxy] : Designated healthcare proxy [Name: ___] : Health Care Proxy's Name: [unfilled]  [Never] : Never [Excellent] : ~his/her~  mood as  excellent [Audit-CScore] : 3 [de-identified] : exercises occ [de-identified] : good [[id number]] : 0 [Change in mental status noted] : No change in mental status noted [Sexually Active] : not sexually active [Reports changes in hearing] : Reports no changes in hearing [Reports changes in vision] : Reports no changes in vision [Reports changes in dental health] : Reports no changes in dental health [PapSmearDate] : 05/23 [FreeTextEntry2] :  in [place] [AdvancecareDate] : 02/24

## 2024-02-05 ENCOUNTER — TRANSCRIPTION ENCOUNTER (OUTPATIENT)
Age: 29
End: 2024-02-05

## 2024-02-13 DIAGNOSIS — R74.8 ABNORMAL LEVELS OF OTHER SERUM ENZYMES: ICD-10-CM

## 2024-03-17 LAB
ALBUMIN SERPL ELPH-MCNC: 4.6 G/DL
ALP BLD-CCNC: 80 U/L
ALT SERPL-CCNC: 25 U/L
AST SERPL-CCNC: 21 U/L
BASOPHILS # BLD AUTO: 0.05 K/UL
BASOPHILS NFR BLD AUTO: 0.7 %
BILIRUB DIRECT SERPL-MCNC: 0.1 MG/DL
BILIRUB INDIRECT SERPL-MCNC: 0.3 MG/DL
BILIRUB SERPL-MCNC: 0.4 MG/DL
EOSINOPHIL # BLD AUTO: 0.21 K/UL
EOSINOPHIL NFR BLD AUTO: 3 %
HCT VFR BLD CALC: 38.8 %
HGB BLD-MCNC: 12.2 G/DL
IMM GRANULOCYTES NFR BLD AUTO: 0.3 %
LYMPHOCYTES # BLD AUTO: 1.79 K/UL
LYMPHOCYTES NFR BLD AUTO: 25.9 %
MAN DIFF?: NORMAL
MCHC RBC-ENTMCNC: 27.2 PG
MCHC RBC-ENTMCNC: 31.4 GM/DL
MCV RBC AUTO: 86.6 FL
MONOCYTES # BLD AUTO: 0.48 K/UL
MONOCYTES NFR BLD AUTO: 6.9 %
NEUTROPHILS # BLD AUTO: 4.36 K/UL
NEUTROPHILS NFR BLD AUTO: 63.2 %
PLATELET # BLD AUTO: 290 K/UL
PROT SERPL-MCNC: 7.6 G/DL
RBC # BLD: 4.48 M/UL
RBC # FLD: 13.3 %
WBC # FLD AUTO: 6.91 K/UL

## 2024-03-18 ENCOUNTER — TRANSCRIPTION ENCOUNTER (OUTPATIENT)
Age: 29
End: 2024-03-18

## 2024-03-23 ENCOUNTER — NON-APPOINTMENT (OUTPATIENT)
Age: 29
End: 2024-03-23

## 2024-03-24 ENCOUNTER — NON-APPOINTMENT (OUTPATIENT)
Age: 29
End: 2024-03-24

## 2024-07-24 ENCOUNTER — NON-APPOINTMENT (OUTPATIENT)
Age: 29
End: 2024-07-24

## 2024-11-05 ENCOUNTER — NON-APPOINTMENT (OUTPATIENT)
Age: 29
End: 2024-11-05

## 2025-01-12 ENCOUNTER — NON-APPOINTMENT (OUTPATIENT)
Age: 30
End: 2025-01-12

## 2025-01-13 ENCOUNTER — NON-APPOINTMENT (OUTPATIENT)
Age: 30
End: 2025-01-13

## 2025-02-04 ENCOUNTER — NON-APPOINTMENT (OUTPATIENT)
Age: 30
End: 2025-02-04

## 2025-02-04 ENCOUNTER — APPOINTMENT (OUTPATIENT)
Dept: INTERNAL MEDICINE | Facility: CLINIC | Age: 30
End: 2025-02-04
Payer: COMMERCIAL

## 2025-02-04 VITALS
SYSTOLIC BLOOD PRESSURE: 120 MMHG | WEIGHT: 171 LBS | HEART RATE: 70 BPM | BODY MASS INDEX: 25.33 KG/M2 | RESPIRATION RATE: 14 BRPM | OXYGEN SATURATION: 99 % | DIASTOLIC BLOOD PRESSURE: 76 MMHG | HEIGHT: 69 IN

## 2025-02-04 DIAGNOSIS — R00.0 TACHYCARDIA, UNSPECIFIED: ICD-10-CM

## 2025-02-04 DIAGNOSIS — Z13.6 ENCOUNTER FOR SCREENING FOR CARDIOVASCULAR DISORDERS: ICD-10-CM

## 2025-02-04 DIAGNOSIS — K25.9 GASTRIC ULCER, UNSPECIFIED AS ACUTE OR CHRONIC, W/OUT HEMORRHAGE OR PERFORATION: ICD-10-CM

## 2025-02-04 DIAGNOSIS — Z00.00 ENCOUNTER FOR GENERAL ADULT MEDICAL EXAMINATION W/OUT ABNORMAL FINDINGS: ICD-10-CM

## 2025-02-04 DIAGNOSIS — K50.90 CROHN'S DISEASE, UNSPECIFIED, W/OUT COMPLICATIONS: ICD-10-CM

## 2025-02-04 DIAGNOSIS — Z79.899 OTHER LONG TERM (CURRENT) DRUG THERAPY: ICD-10-CM

## 2025-02-04 PROCEDURE — 93000 ELECTROCARDIOGRAM COMPLETE: CPT

## 2025-02-04 PROCEDURE — 36415 COLL VENOUS BLD VENIPUNCTURE: CPT

## 2025-02-04 PROCEDURE — 99395 PREV VISIT EST AGE 18-39: CPT

## 2025-02-04 RX ORDER — OMEPRAZOLE 40 MG/1
40 CAPSULE, DELAYED RELEASE ORAL
Qty: 90 | Refills: 1 | Status: ACTIVE | COMMUNITY
Start: 2025-02-04

## 2025-02-04 RX ORDER — MESALAMINE 1.2 G/1
1.2 TABLET, DELAYED RELEASE ORAL DAILY
Refills: 0 | Status: ACTIVE | COMMUNITY
Start: 2025-02-04

## 2025-02-05 ENCOUNTER — TRANSCRIPTION ENCOUNTER (OUTPATIENT)
Age: 30
End: 2025-02-05

## 2025-02-05 LAB
ALBUMIN SERPL ELPH-MCNC: 4.5 G/DL
ALP BLD-CCNC: 148 U/L
ALT SERPL-CCNC: 53 U/L
ANION GAP SERPL CALC-SCNC: 14 MMOL/L
APPEARANCE: CLEAR
AST SERPL-CCNC: 38 U/L
BACTERIA: ABNORMAL /HPF
BASOPHILS # BLD AUTO: 0.02 K/UL
BASOPHILS NFR BLD AUTO: 0.3 %
BILIRUB SERPL-MCNC: 0.3 MG/DL
BILIRUBIN URINE: NEGATIVE
BLOOD URINE: NEGATIVE
BUN SERPL-MCNC: 11 MG/DL
CALCIUM SERPL-MCNC: 9.8 MG/DL
CAST: 1 /LPF
CHLORIDE SERPL-SCNC: 100 MMOL/L
CHOLEST SERPL-MCNC: 194 MG/DL
CO2 SERPL-SCNC: 24 MMOL/L
COLOR: YELLOW
CREAT SERPL-MCNC: 0.67 MG/DL
EGFR: 121 ML/MIN/1.73M2
EOSINOPHIL # BLD AUTO: 0.06 K/UL
EOSINOPHIL NFR BLD AUTO: 0.9 %
EPITHELIAL CELLS: 12 /HPF
GLUCOSE QUALITATIVE U: NEGATIVE MG/DL
GLUCOSE SERPL-MCNC: 75 MG/DL
HCT VFR BLD CALC: 38.3 %
HDLC SERPL-MCNC: 40 MG/DL
HGB BLD-MCNC: 12.2 G/DL
IMM GRANULOCYTES NFR BLD AUTO: 0.1 %
KETONES URINE: NEGATIVE MG/DL
LDLC SERPL CALC-MCNC: 126 MG/DL
LEUKOCYTE ESTERASE URINE: ABNORMAL
LYMPHOCYTES # BLD AUTO: 2 K/UL
LYMPHOCYTES NFR BLD AUTO: 29.3 %
MAN DIFF?: NORMAL
MCHC RBC-ENTMCNC: 28 PG
MCHC RBC-ENTMCNC: 31.9 G/DL
MCV RBC AUTO: 87.8 FL
MICROSCOPIC-UA: NORMAL
MONOCYTES # BLD AUTO: 0.5 K/UL
MONOCYTES NFR BLD AUTO: 7.3 %
NEUTROPHILS # BLD AUTO: 4.23 K/UL
NEUTROPHILS NFR BLD AUTO: 62.1 %
NITRITE URINE: NEGATIVE
NONHDLC SERPL-MCNC: 154 MG/DL
PH URINE: 6.5
PLATELET # BLD AUTO: 289 K/UL
POTASSIUM SERPL-SCNC: 3.9 MMOL/L
PROT SERPL-MCNC: 7.4 G/DL
PROTEIN URINE: NEGATIVE MG/DL
RBC # BLD: 4.36 M/UL
RBC # FLD: 13.4 %
RED BLOOD CELLS URINE: 0 /HPF
SODIUM SERPL-SCNC: 137 MMOL/L
SPECIFIC GRAVITY URINE: 1.01
T4 FREE SERPL-MCNC: 1 NG/DL
TRIGL SERPL-MCNC: 156 MG/DL
TSH SERPL-ACNC: 1.5 UIU/ML
UROBILINOGEN URINE: 0.2 MG/DL
WBC # FLD AUTO: 6.82 K/UL
WHITE BLOOD CELLS URINE: 2 /HPF

## 2025-07-02 ENCOUNTER — EMERGENCY (EMERGENCY)
Facility: HOSPITAL | Age: 30
LOS: 1 days | End: 2025-07-02
Attending: EMERGENCY MEDICINE
Payer: COMMERCIAL

## 2025-07-02 VITALS
RESPIRATION RATE: 20 BRPM | WEIGHT: 166.89 LBS | SYSTOLIC BLOOD PRESSURE: 103 MMHG | DIASTOLIC BLOOD PRESSURE: 64 MMHG | HEIGHT: 69 IN | TEMPERATURE: 98 F | HEART RATE: 61 BPM | OXYGEN SATURATION: 100 %

## 2025-07-02 LAB
ALBUMIN SERPL ELPH-MCNC: 3.9 G/DL — SIGNIFICANT CHANGE UP (ref 3.3–5.2)
ALP SERPL-CCNC: 103 U/L — SIGNIFICANT CHANGE UP (ref 40–120)
ALT FLD-CCNC: 56 U/L — HIGH
ANION GAP SERPL CALC-SCNC: 11 MMOL/L — SIGNIFICANT CHANGE UP (ref 5–17)
APPEARANCE UR: CLEAR — SIGNIFICANT CHANGE UP
AST SERPL-CCNC: 36 U/L — HIGH
BILIRUB SERPL-MCNC: 0.2 MG/DL — LOW (ref 0.4–2)
BILIRUB UR-MCNC: NEGATIVE — SIGNIFICANT CHANGE UP
BUN SERPL-MCNC: 13.1 MG/DL — SIGNIFICANT CHANGE UP (ref 8–20)
CALCIUM SERPL-MCNC: 8.5 MG/DL — SIGNIFICANT CHANGE UP (ref 8.4–10.5)
CHLORIDE SERPL-SCNC: 104 MMOL/L — SIGNIFICANT CHANGE UP (ref 96–108)
CO2 SERPL-SCNC: 20 MMOL/L — LOW (ref 22–29)
COLOR SPEC: YELLOW — SIGNIFICANT CHANGE UP
CREAT SERPL-MCNC: 0.68 MG/DL — SIGNIFICANT CHANGE UP (ref 0.5–1.3)
DIFF PNL FLD: NEGATIVE — SIGNIFICANT CHANGE UP
EGFR: 120 ML/MIN/1.73M2 — SIGNIFICANT CHANGE UP
EGFR: 120 ML/MIN/1.73M2 — SIGNIFICANT CHANGE UP
GLUCOSE SERPL-MCNC: 86 MG/DL — SIGNIFICANT CHANGE UP (ref 70–99)
GLUCOSE UR QL: NEGATIVE MG/DL — SIGNIFICANT CHANGE UP
HCG SERPL-ACNC: <4 MIU/ML — SIGNIFICANT CHANGE UP
HCT VFR BLD CALC: 35.9 % — SIGNIFICANT CHANGE UP (ref 34.5–45)
HGB BLD-MCNC: 11.6 G/DL — SIGNIFICANT CHANGE UP (ref 11.5–15.5)
KETONES UR QL: NEGATIVE MG/DL — SIGNIFICANT CHANGE UP
LEUKOCYTE ESTERASE UR-ACNC: NEGATIVE — SIGNIFICANT CHANGE UP
MCHC RBC-ENTMCNC: 28.2 PG — SIGNIFICANT CHANGE UP (ref 27–34)
MCHC RBC-ENTMCNC: 32.3 G/DL — SIGNIFICANT CHANGE UP (ref 32–36)
MCV RBC AUTO: 87.1 FL — SIGNIFICANT CHANGE UP (ref 80–100)
NITRITE UR-MCNC: NEGATIVE — SIGNIFICANT CHANGE UP
NRBC # BLD AUTO: 0 K/UL — SIGNIFICANT CHANGE UP (ref 0–0)
NRBC # FLD: 0 K/UL — SIGNIFICANT CHANGE UP (ref 0–0)
NRBC BLD AUTO-RTO: 0 /100 WBCS — SIGNIFICANT CHANGE UP (ref 0–0)
PH UR: 6 — SIGNIFICANT CHANGE UP (ref 5–8)
PLATELET # BLD AUTO: 213 K/UL — SIGNIFICANT CHANGE UP (ref 150–400)
PMV BLD: 10.7 FL — SIGNIFICANT CHANGE UP (ref 7–13)
POTASSIUM SERPL-MCNC: 4 MMOL/L — SIGNIFICANT CHANGE UP (ref 3.5–5.3)
POTASSIUM SERPL-SCNC: 4 MMOL/L — SIGNIFICANT CHANGE UP (ref 3.5–5.3)
PROT SERPL-MCNC: 6.5 G/DL — LOW (ref 6.6–8.7)
PROT UR-MCNC: NEGATIVE MG/DL — SIGNIFICANT CHANGE UP
RBC # BLD: 4.12 M/UL — SIGNIFICANT CHANGE UP (ref 3.8–5.2)
RBC # FLD: 13.2 % — SIGNIFICANT CHANGE UP (ref 10.3–14.5)
SODIUM SERPL-SCNC: 135 MMOL/L — SIGNIFICANT CHANGE UP (ref 135–145)
SP GR SPEC: 1.01 — SIGNIFICANT CHANGE UP (ref 1–1.03)
TROPONIN T, HIGH SENSITIVITY RESULT: <6 NG/L — SIGNIFICANT CHANGE UP (ref 0–51)
UROBILINOGEN FLD QL: 0.2 MG/DL — SIGNIFICANT CHANGE UP (ref 0.2–1)
WBC # BLD: 5.81 K/UL — SIGNIFICANT CHANGE UP (ref 3.8–10.5)
WBC # FLD AUTO: 5.81 K/UL — SIGNIFICANT CHANGE UP (ref 3.8–10.5)

## 2025-07-02 PROCEDURE — 93005 ELECTROCARDIOGRAM TRACING: CPT

## 2025-07-02 PROCEDURE — 81003 URINALYSIS AUTO W/O SCOPE: CPT

## 2025-07-02 PROCEDURE — 80053 COMPREHEN METABOLIC PANEL: CPT

## 2025-07-02 PROCEDURE — 84702 CHORIONIC GONADOTROPIN TEST: CPT

## 2025-07-02 PROCEDURE — 99283 EMERGENCY DEPT VISIT LOW MDM: CPT | Mod: 25

## 2025-07-02 PROCEDURE — 36415 COLL VENOUS BLD VENIPUNCTURE: CPT

## 2025-07-02 PROCEDURE — 93010 ELECTROCARDIOGRAM REPORT: CPT

## 2025-07-02 PROCEDURE — 82962 GLUCOSE BLOOD TEST: CPT

## 2025-07-02 PROCEDURE — 99285 EMERGENCY DEPT VISIT HI MDM: CPT

## 2025-07-02 PROCEDURE — 85027 COMPLETE CBC AUTOMATED: CPT

## 2025-07-02 PROCEDURE — 84484 ASSAY OF TROPONIN QUANT: CPT

## 2025-07-02 RX ORDER — AMOXICILLIN AND CLAVULANATE POTASSIUM 500; 125 MG/1; MG/1
1 TABLET, FILM COATED ORAL
Qty: 14 | Refills: 0
Start: 2025-07-02 | End: 2025-07-08

## 2025-07-02 RX ADMIN — Medication 1000 MILLILITER(S): at 08:20

## 2025-07-02 NOTE — ED ADULT NURSE NOTE - OBJECTIVE STATEMENT
Assumed care of patient in Ed alert and oriented x4, s/p syncopal episode while going to restroom today. Pt reports this has happened in the past and has had multiple workups and told it was vasovagal.  Lip laceration noted. Iv placed labs sent, VSS. NSR noted to CM. Pt refused CT scan. Md aware.

## 2025-07-02 NOTE — ED PROVIDER NOTE - CLINICAL SUMMARY MEDICAL DECISION MAKING FREE TEXT BOX
Patient with a history of Crohn's presenting after she syncopized on the bathroom while urinating.  Patient states she felt "a lot of pain" and then fell hitting her head her neck as well as the front of her lip states she has syncopized multiple times in the past.  On exam no abdominal tenderness does have 1 cm laceration that is gaping to the bottom right lip does not violate the vermilion border as on the vermilion.  Patient states she is getting  next month and would like plastics to fix the lack.  Fees and CAT scan explained risks and benefits of a patient states she feels okay and would not like it right now.  Labs reviewed nonactionable.  Spoke with Dr. Reddy  from plastics.  Patient is to be seen in his office at 1130 given directions synciope most likley 2/2 vasovagal .  Augmentin sent stable for DC    ekg sinus gabriella no acute ischemia

## 2025-07-02 NOTE — ED PROVIDER NOTE - NSFOLLOWUPINSTRUCTIONS_ED_ALL_ED_FT
Please follow with Dr. Romario brandon   take abx   return if worsening   follow pmd     Joaquin Brandon MD  [address] [place] [id number] · 2.4 mi  [phone]

## 2025-07-02 NOTE — ED PROVIDER NOTE - PHYSICAL EXAMINATION
GENERAL: no acute distress, comfortably in bed  HEENT: neck supple no  midline , 1cm  lip laceration gaping bottom R lip.   NEURO:  A&Ox3, no focal deficits, moving all extremities spontaneously, no dysarthria,   PSYCH: Normal affect, calm, appropriate insight and judgment, fluent speech  LUNGS: CTAB, no wrr, non-labored breathing  HEART: RRR, no murmur appreciated  ABD: Soft, non-tender, non-distended, no organomegaly, no appreciable masses  EXTREMITIES: Nontender, no clubbing, cyanosis, or edema  SKIN: No rashes or lesions

## 2025-07-02 NOTE — ED PROVIDER NOTE - PATIENT PORTAL LINK FT
You can access the FollowMyHealth Patient Portal offered by [facility] by registering at the following website: [email or web address]. By joining [employer]’s FollowMyHealth portal, you will also be able to view your health information using other applications (apps) compatible with our system.

## 2025-07-02 NOTE — ED ADULT TRIAGE NOTE - CHIEF COMPLAINT QUOTE
pt states she was having abdominal cramping and went to bathroom with syncopal episode with head strike on tile + loc, no blood thinners. laceration to right side lower lip.  denies cp denies sob.

## 2025-07-03 ENCOUNTER — NON-APPOINTMENT (OUTPATIENT)
Age: 30
End: 2025-07-03

## 2025-07-08 DIAGNOSIS — S01.511A LACERATION WITHOUT FOREIGN BODY OF LIP, INITIAL ENCOUNTER: ICD-10-CM

## 2025-07-08 DIAGNOSIS — R00.1 BRADYCARDIA, UNSPECIFIED: ICD-10-CM

## 2025-07-08 DIAGNOSIS — K50.90 CROHN'S DISEASE, UNSPECIFIED, WITHOUT COMPLICATIONS: ICD-10-CM
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
## 2025-07-08 DIAGNOSIS — R55 SYNCOPE AND COLLAPSE: ICD-10-CM
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
